# Patient Record
Sex: FEMALE | Race: BLACK OR AFRICAN AMERICAN | Employment: UNEMPLOYED | RURAL
[De-identification: names, ages, dates, MRNs, and addresses within clinical notes are randomized per-mention and may not be internally consistent; named-entity substitution may affect disease eponyms.]

---

## 2018-01-26 ENCOUNTER — OFFICE VISIT (OUTPATIENT)
Dept: FAMILY MEDICINE CLINIC | Age: 28
End: 2018-01-26

## 2018-01-26 VITALS
TEMPERATURE: 98.1 F | SYSTOLIC BLOOD PRESSURE: 112 MMHG | DIASTOLIC BLOOD PRESSURE: 72 MMHG | WEIGHT: 268 LBS | HEIGHT: 67 IN | HEART RATE: 92 BPM | OXYGEN SATURATION: 99 % | BODY MASS INDEX: 42.06 KG/M2

## 2018-01-26 DIAGNOSIS — Z11.3 SCREEN FOR STD (SEXUALLY TRANSMITTED DISEASE): ICD-10-CM

## 2018-01-26 DIAGNOSIS — F33.9 RECURRENT DEPRESSION (HCC): ICD-10-CM

## 2018-01-26 DIAGNOSIS — R06.2 WHEEZING: ICD-10-CM

## 2018-01-26 DIAGNOSIS — Z01.419 WELL WOMAN EXAM WITH ROUTINE GYNECOLOGICAL EXAM: Primary | ICD-10-CM

## 2018-01-26 DIAGNOSIS — L30.9 ECZEMA, UNSPECIFIED TYPE: ICD-10-CM

## 2018-01-26 RX ORDER — TRIAMCINOLONE ACETONIDE 0.25 MG/G
OINTMENT TOPICAL 2 TIMES DAILY
Qty: 454 G | Refills: 1 | Status: SHIPPED | OUTPATIENT
Start: 2018-01-26 | End: 2019-01-25

## 2018-01-26 RX ORDER — AMITRIPTYLINE HYDROCHLORIDE 10 MG/1
10 TABLET, FILM COATED ORAL
Qty: 30 TAB | Refills: 5 | Status: SHIPPED | OUTPATIENT
Start: 2018-01-26 | End: 2019-01-25

## 2018-01-26 RX ORDER — ALBUTEROL SULFATE 90 UG/1
1 AEROSOL, METERED RESPIRATORY (INHALATION)
Qty: 1 INHALER | Refills: 5 | Status: SHIPPED | OUTPATIENT
Start: 2018-01-26 | End: 2018-07-16 | Stop reason: SDUPTHER

## 2018-01-26 NOTE — MR AVS SNAPSHOT
[address] [facility] [phone] Patient: Jose Damian MRN: [id number] :1990 Visit Information Date & Time Provider Department Dept. Phone Encounter #  
 2018  2:00 PM Jean-Paul León NP [place] [address] [phone] [id number] Upcoming Health Maintenance Date Due  
 HPV AGE 9Y-34Y (1 of 3 - Female 3 Dose Series) 2002 PAP AKA CERVICAL CYTOLOGY 2012 Influenza Age 5 to Adult 2017 DTaP/Tdap/Td series (2 - Td) 2028 Allergies as of 2018  Review Complete On: 2018 By: Jean-Paul León NP Severity Noted Reaction Type Reactions Peanut  2016    Hives Seafood  2016    Hives Current Immunizations  Reviewed on 2018 No immunizations on file. Reviewed by Jackson Britt LPN on  at  2:05 PM  
You Were Diagnosed With   
  
 Codes Comments Well woman exam with routine gynecological exam    -  Primary ICD-10-CM: J19.462 ICD-9-CM: V72.31 Wheezing     ICD-10-CM: R06.2 ICD-9-CM: 786.07 Eczema, unspecified type     ICD-10-CM: L30.9 ICD-9-CM: 692.9 Recurrent depression ([address]     ICD-10-CM: F33.9 ICD-9-CM: 296.30 Screen for STD (sexually transmitted disease)     ICD-10-CM: Z11.3 ICD-9-CM: V74.5 Vitals BP Pulse Temp Height(growth percentile) Weight(growth percentile) SpO2  
 112/72 92 98.1 °F (36.7 °C) (Oral) 5' 7\" (1.702 m) 268 lb (121.6 kg) 99% BMI OB Status Smoking Status 41.97 kg/m2 Recent pregnancy Never Smoker Vitals History BMI and BSA Data Body Mass Index Body Surface Area 41.97 kg/m 2 2.4 m 2 Preferred Pharmacy Pharmacy Name Phone [id number] [place], [address] [phone] Your Updated Medication List  
  
   
This list is accurate as of: 18  2:49 PM.  Always use your most recent med list.  
  
  
  
  
 albuterol 90 mcg/actuation inhaler Commonly known as:  PROVENTIL HFA, VENTOLIN HFA, PROAIR HFA Take 1 Puff by inhalation every four (4) hours as needed for Wheezing. amitriptyline 10 mg tablet Commonly known as:  ELAVIL Take 1 Tab by mouth nightly. crisaborole 2 % Oint Commonly known as:  Brigid Abel Use thin layer to affected area daily  
  
 triamcinolone acetonide 0.025 % ointment Commonly known as:  KENALOG Apply  to affected area two (2) times a day. use thin layer Prescriptions Sent to Pharmacy Refills  
 albuterol (PROVENTIL HFA, VENTOLIN HFA, PROAIR HFA) 90 mcg/actuation inhaler 5 Sig: Take 1 Puff by inhalation every four (4) hours as needed for Wheezing. Class: Normal  
 Pharmacy: [facility]/pharmacy #[id number] [place], [address] Ph #: [phone] Route: Inhalation  
 triamcinolone acetonide (KENALOG) 0.025 % ointment 1 Sig: Apply  to affected area two (2) times a day. use thin layer Class: Normal  
 Pharmacy: [facility]/pharmacy #[id number] Marien Dellen, [address] Ph #: [phone] Route: Topical  
 amitriptyline (ELAVIL) 10 mg tablet 5 Sig: Take 1 Tab by mouth nightly. Class: Normal  
 Pharmacy: [facility]/pharmacy #[id number] Marien Dellen, [address] Ph #: [phone] Route: Oral  
  
We Performed the Following CBC WITH AUTOMATED DIFF [23455 CPT(R)] CT/NG/T.VAGINALIS AMPLIFICATION P5732428 CPT(R)] LIPID PANEL [35023 CPT(R)] METABOLIC PANEL, COMPREHENSIVE [69858 CPT(R)] PAP IGP,APTIMA HPV AGE GDLN [PVM7979 Custom] TSH 3RD GENERATION [46549 CPT(R)] Patient Instructions If you have any questions regarding [employer], you may call [employer] support at [phone]. Introducing [facility] & HEALTH SERVICES!    
 [employer] introduces [employer] patient portal. Now you can access parts of your medical record, email your doctor's office, and request medication refills online. 1. In your internet browser, go to [email or web address] 2. Click on the First Time User? Click Here link in the Sign In box. You will see the New Member Sign Up page. 3. Enter your Parko Access Code exactly as it appears below. You will not need to use this code after youve completed the sign-up process. If you do not sign up before the expiration date, you must request a new code. · Parko Access Code: [email or web address] Expires: 2/24/2018  4:19 PM 
 
4. Enter the last four digits of your Social Security Number (xxxx) and Date of Birth (mm/dd/yyyy) as indicated and click Submit. You will be taken to the next sign-up page. 5. Create a Parko ID. This will be your Parko login ID and cannot be changed, so think of one that is secure and easy to remember. 6. Create a Parko password. You can change your password at any time. 7. Enter your Password Reset Question and Answer. This can be used at a later time if you forget your password. 8. Enter your e-mail address. You will receive e-mail notification when new information is available in [address] 9. Click Sign Up. You can now view and download portions of your medical record. 10. Click the Download Summary menu link to download a portable copy of your medical information. If you have questions, please visit the Frequently Asked Questions section of the Parko website. Remember, Parko is NOT to be used for urgent needs. For medical emergencies, dial 911. Now available from your iPhone and Android! Please provide this summary of care documentation to your next provider. Your primary care clinician is listed as Una Saleh. If you have any questions after today's visit, please call [phone].

## 2018-01-26 NOTE — PROGRESS NOTES
Chief Complaint   Patient presents with    Establish Care     rash over left eye         HPI:       is a 33 y.o. female. Works for [employer]. In the [employer] office. Works two jobs. Has a young son. Asthma: Uses an albuterol inhaler intermittently. Eczema: Flares from time-to-time. Has used steroid creams in the past with great result. Has been out for a while. Using Vaseline for daily care. Depression: Previously on Prozac, but it made her really tired. She has not had the energy to do anything has been gaining weight. New Issues:  Has a rash over her left eye that is improving. Was a red bump. It drained. Has been trying to leave it alone. Allergies   Allergen Reactions    Peanut Hives    Seafood Hives       Current Outpatient Prescriptions   Medication Sig    albuterol (PROVENTIL HFA, VENTOLIN HFA, PROAIR HFA) 90 mcg/actuation inhaler Take  by inhalation. No current facility-administered medications for this visit. Past Medical History:   Diagnosis Date    Asthma        Past Surgical History:   Procedure Laterality Date    HX OTHER SURGICAL      eczema       Social History     Social History    Marital status: SINGLE     Spouse name: N/A    Number of children: N/A    Years of education: N/A     Social History Main Topics    Smoking status: Never Smoker    Smokeless tobacco: Never Used    Alcohol use No    Drug use: None    Sexual activity: Yes     Partners: Male     Other Topics Concern    None     Social History Narrative       No family history on file. Above history reviewed. ROS:  Denies fever, chills, cough, chest pain, SOB,  nausea, vomiting, or diarrhea. Denies wt loss, wt gain, hemoptysis, hematochezia or melena.     Physical Examination:    /72 Comment: large cuff  Pulse 92  Temp 98.1 °F (36.7 °C) (Oral)   Ht 5' 7\" (1.702 m)  Wt 268 lb (121.6 kg)  SpO2 99%  BMI 41.97 kg/m2    General: Alert and Ox3, Fluent speech, overweight  HEENT:  PERRLA, EOM intact, TMs, turbinates, pharynx normal.  No thyromegaly. No cervical adenopathy. Neck:  Supple, no adenopathy, JVD, mass or bruit  Chest:  Clear to Ausculation, without wheezes, rales, rubs or ronchi  Cardiac: RRR  Abdomen:  +BS, soft, nontender without palpable HSM  Genitourinary: The external genitalia is without lesions. Introitus is normal, vaginal walls pink and moist without lesions or evidence of trauma. There is no cervical motion tenderness and the adnexa are without masses. There is no abnormal discharge from the cervix. Extremities:  No cyanosis, clubbing or edema  Neurologic:  Ambulatory without assist, CN 2-12 grossly intact. Moves all extremities. Skin: Dry flaky areas over face, elbows and lower legs. Lymphadenopathy: no cervical or supraclavicular nodes    ASSESSMENT AND PLAN:     1. Well woman exam with routine gynecological exam  I have reviewed/discussed the above normal BMI with the patient. I have recommended the following interventions: dietary management education, guidance, and counseling, encourage exercise and monitor weight . Arvid Robb - PAP IGP,APTIMA HPV AGE GDLN  - CT/NG/T.VAGINALIS AMPLIFICATION  - LIPID PANEL  - METABOLIC PANEL, COMPREHENSIVE  - TSH 3RD GENERATION  - CBC WITH AUTOMATED DIFF    2. Wheezing  Refilling  - albuterol (PROVENTIL HFA, VENTOLIN HFA, PROAIR HFA) 90 mcg/actuation inhaler; Take 1 Puff by inhalation every four (4) hours as needed for Wheezing. Dispense: 1 Inhaler; Refill: 5    3. Eczema, unspecified type  Refilling Kenalog  Trying Eucrisa, will send in larger amount if effective  - triamcinolone acetonide (KENALOG) 0.025 % ointment; Apply  to affected area two (2) times a day. use thin layer  Dispense: 454 g; Refill: 1  - crisaborole (EUCRISA) 2 % oint; Use thin layer to affected area daily  Dispense: 2.5 g; Refill: 0    4.  Recurrent depression ([address]  Starting Amitriptyline, may need to increase dose  - amitriptyline (ELAVIL) 10 mg tablet; Take 1 Tab by mouth nightly. Dispense: 30 Tab; Refill: 5    5.  Screen for STD (sexually transmitted disease)  Screening  - CT/NG/T.VAGINALIS AMPLIFICATION     RTC in 1 month    Berna Colbert NP

## 2018-01-26 NOTE — PATIENT INSTRUCTIONS
If you have any questions regarding [facility], you may call [employer] support at [phone]. Depression and Chronic Disease: Care Instructions  Your Care Instructions    A chronic disease is one that you have for a long time. Some chronic diseases can be controlled, but they usually cannot be cured. Depression is common in people with chronic diseases, but it often goes unnoticed. Many people have concerns about seeking treatment for a mental health problem. You may think it's a sign of weakness, or you don't want people to know about it. It's important to overcome these reasons for not seeking treatment. Treating depression or anxiety is good for your health. Follow-up care is a key part of your treatment and safety. Be sure to make and go to all appointments, and call your doctor if you are having problems. It's also a good idea to know your test results and keep a list of the medicines you take. How can you care for yourself at home? Watch for symptoms of depression  The symptoms of depression are often subtle at first. You may think they are caused by your disease rather than depression. Or you may think it is normal to be depressed when you have a chronic disease. If you are depressed you may:  · Feel sad or hopeless. · Feel guilty or worthless. · Not enjoy the things you used to enjoy. · Feel hopeless, as though life is not worth living. · Have trouble thinking or remembering. · Have low energy, and you may not eat or sleep well. · Pull away from others. · Think often about death or killing yourself. (Keep the numbers for these national suicide hotlines: 9-811-822-TALK [1-328.393.8727] and 2-064-OTFUXDM [1-197.600.6373]. )  Get treatment  By treating your depression, you can feel more hopeful and have more energy. If you feel better, you may take better care of yourself, so your health may improve. · Talk to your doctor if you have any changes in mood during treatment for your disease.   · Ask your doctor for help. Counseling, antidepressant medicine, or a combination of the two can help most people with depression. Often a combination works best. Counseling can also help you cope with having a chronic disease. When should you call for help? Call 911 anytime you think you may need emergency care. For example, call if:  ? · You feel like hurting yourself or someone else. ? · Someone you know has depression and is about to attempt or is attempting suicide. ?Call your doctor now or seek immediate medical care if:  ? · You hear voices. ? · Someone you know has depression and:  ¨ Starts to give away his or her possessions. ¨ Uses illegal drugs or drinks alcohol heavily. ¨ Talks or writes about death, including writing suicide notes or talking about guns, knives, or pills. ¨ Starts to spend a lot of time alone. ¨ Acts very aggressively or suddenly appears calm. ? Watch closely for changes in your health, and be sure to contact your doctor if:  ? · You do not get better as expected. Where can you learn more? Go to http://richard-emanuel.info/. Enter D360 in the search box to learn more about \"Depression and Chronic Disease: Care Instructions. \"  Current as of: May 12, 2017  Content Version: 11.4  © 2482-4105 Healthwise, Incorporated. Care instructions adapted under license by [employer] (which disclaims liability or warranty for this information). If you have questions about a medical condition or this instruction, always ask your healthcare professional. [place] [address] any warranty or liability for your use of this information. Well Visit, Ages 25 to 48: Care Instructions  Your Care Instructions    Physical exams can help you stay healthy. Your doctor has checked your overall health and may have suggested ways to take good care of yourself. He or she also may have recommended tests.  At home, you can help prevent illness with healthy eating, regular exercise, and other steps. Follow-up care is a key part of your treatment and safety. Be sure to make and go to all appointments, and call your doctor if you are having problems. It's also a good idea to know your test results and keep a list of the medicines you take. How can you care for yourself at home? · Reach and stay at a healthy weight. This will lower your risk for many problems, such as obesity, diabetes, heart disease, and high blood pressure. · Get at least 30 minutes of physical activity on most days of the week. Walking is a good choice. You also may want to do other activities, such as running, swimming, cycling, or playing tennis or team sports. Discuss any changes in your exercise program with your doctor. · Do not smoke or allow others to smoke around you. If you need help quitting, talk to your doctor about stop-smoking programs and medicines. These can increase your chances of quitting for good. · Talk to your doctor about whether you have any risk factors for sexually transmitted infections (STIs). Having one sex partner (who does not have STIs and does not have sex with anyone else) is a good way to avoid these infections. · Use birth control if you do not want to have children at this time. Talk with your doctor about the choices available and what might be best for you. · Protect your skin from too much sun. When you're outdoors from 10 a.m. to 4 p.m., stay in the shade or cover up with clothing and a hat with a wide brim. Wear sunglasses that block UV rays. Even when it's cloudy, put broad-spectrum sunscreen (SPF 30 or higher) on any exposed skin. · See a dentist one or two times a year for checkups and to have your teeth cleaned. · Wear a seat belt in the car. · Drink alcohol in moderation, if at all. That means no more than 2 drinks a day for men and 1 drink a day for women. Follow your doctor's advice about when to have certain tests.  These tests can spot problems early.  For everyone  · Cholesterol. Have the fat (cholesterol) in your blood tested after age 21. Your doctor will tell you how often to have this done based on your age, family history, or other things that can increase your risk for heart disease. · Blood pressure. Have your blood pressure checked during a routine doctor visit. Your doctor will tell you how often to check your blood pressure based on your age, your blood pressure results, and other factors. · Vision. Talk with your doctor about how often to have a glaucoma test.  · Diabetes. Ask your doctor whether you should have tests for diabetes. · Colon cancer. Have a test for colon cancer at age 48. You may have one of several tests. If you are younger than 48, you may need a test earlier if you have any risk factors. Risk factors include whether you already had a precancerous polyp removed from your colon or whether your parent, brother, sister, or child has had colon cancer. For women  · Breast exam and mammogram. Talk to your doctor about when you should have a clinical breast exam and a mammogram. Medical experts differ on whether and how often women under 50 should have these tests. Your doctor can help you decide what is right for you. · Pap test and pelvic exam. Begin Pap tests at age 24. A Pap test is the best way to find cervical cancer. The test often is part of a pelvic exam. Ask how often to have this test.  · Tests for sexually transmitted infections (STIs). Ask whether you should have tests for STIs. You may be at risk if you have sex with more than one person, especially if your partners do not wear condoms. For men  · Tests for sexually transmitted infections (STIs). Ask whether you should have tests for STIs. You may be at risk if you have sex with more than one person, especially if you do not wear a condom. · Testicular cancer exam. Ask your doctor whether you should check your testicles regularly.   · Prostate exam. Talk to your doctor about whether you should have a blood test (called a PSA test) for prostate cancer. Experts differ on whether and when men should have this test. Some experts suggest it if you are older than 39 and are -American or have a father or brother who got prostate cancer when he was younger than 72. When should you call for help? Watch closely for changes in your health, and be sure to contact your doctor if you have any problems or symptoms that concern you. Where can you learn more? Go to http://richard-emanuel.info/. Enter P072 in the search box to learn more about \"Well Visit, Ages 25 to 48: Care Instructions. \"  Current as of: May 12, 2017  Content Version: 11.4  © 7987-7745 Healthwise, Incorporated. Care instructions adapted under license by [employer] (which disclaims liability or warranty for this information). If you have questions about a medical condition or this instruction, always ask your healthcare professional. [place] [address] any warranty or liability for your use of this information.

## 2018-01-30 LAB
AGE GDLN ACOG TESTING, 191185: NORMAL
CYTOLOGIST CVX/VAG CYTO: NORMAL
CYTOLOGY CVX/VAG DOC THIN PREP: NORMAL
DX ICD CODE: NORMAL
LABCORP, 190119: NORMAL
Lab: NORMAL
OTHER STN SPEC: NORMAL
PATH REPORT.FINAL DX SPEC: NORMAL
STAT OF ADQ CVX/VAG CYTO-IMP: NORMAL

## 2018-01-31 LAB
ALBUMIN SERPL-MCNC: 3.6 G/DL (ref 3.5–5.5)
ALBUMIN/GLOB SERPL: 1 {RATIO} (ref 1.2–2.2)
ALP SERPL-CCNC: 95 IU/L (ref 39–117)
ALT SERPL-CCNC: 13 IU/L (ref 0–32)
AST SERPL-CCNC: 17 IU/L (ref 0–40)
BASOPHILS # BLD AUTO: 0 X10E3/UL (ref 0–0.2)
BASOPHILS NFR BLD AUTO: 0 %
BILIRUB SERPL-MCNC: 0.3 MG/DL (ref 0–1.2)
BUN SERPL-MCNC: 12 MG/DL (ref 6–20)
BUN/CREAT SERPL: 14 (ref 9–23)
C TRACH RRNA SPEC QL NAA+PROBE: NEGATIVE
CALCIUM SERPL-MCNC: 8.9 MG/DL (ref 8.7–10.2)
CHLORIDE SERPL-SCNC: 101 MMOL/L (ref 96–106)
CHOLEST SERPL-MCNC: 162 MG/DL (ref 100–199)
CO2 SERPL-SCNC: 22 MMOL/L (ref 18–29)
CREAT SERPL-MCNC: 0.87 MG/DL (ref 0.57–1)
EOSINOPHIL # BLD AUTO: 0.5 X10E3/UL (ref 0–0.4)
EOSINOPHIL NFR BLD AUTO: 4 %
ERYTHROCYTE [DISTWIDTH] IN BLOOD BY AUTOMATED COUNT: 14 % (ref 12.3–15.4)
GFR SERPLBLD CREATININE-BSD FMLA CKD-EPI: 106 ML/MIN/1.73
GFR SERPLBLD CREATININE-BSD FMLA CKD-EPI: 92 ML/MIN/1.73
GLOBULIN SER CALC-MCNC: 3.6 G/DL (ref 1.5–4.5)
GLUCOSE SERPL-MCNC: 87 MG/DL (ref 65–99)
HCT VFR BLD AUTO: 38.2 % (ref 34–46.6)
HDLC SERPL-MCNC: 32 MG/DL
HGB BLD-MCNC: 13 G/DL (ref 11.1–15.9)
IMM GRANULOCYTES # BLD: 0 X10E3/UL (ref 0–0.1)
IMM GRANULOCYTES NFR BLD: 0 %
LDLC SERPL CALC-MCNC: 111 MG/DL (ref 0–99)
LYMPHOCYTES # BLD AUTO: 4.3 X10E3/UL (ref 0.7–3.1)
LYMPHOCYTES NFR BLD AUTO: 31 %
MCH RBC QN AUTO: 30.9 PG (ref 26.6–33)
MCHC RBC AUTO-ENTMCNC: 34 G/DL (ref 31.5–35.7)
MCV RBC AUTO: 91 FL (ref 79–97)
MONOCYTES # BLD AUTO: 1.8 X10E3/UL (ref 0.1–0.9)
MONOCYTES NFR BLD AUTO: 13 %
N GONORRHOEA RRNA SPEC QL NAA+PROBE: NEGATIVE
NEUTROPHILS # BLD AUTO: 7.4 X10E3/UL (ref 1.4–7)
NEUTROPHILS NFR BLD AUTO: 52 %
PLATELET # BLD AUTO: 391 X10E3/UL (ref 150–379)
POTASSIUM SERPL-SCNC: 4.3 MMOL/L (ref 3.5–5.2)
PROT SERPL-MCNC: 7.2 G/DL (ref 6–8.5)
RBC # BLD AUTO: 4.21 X10E6/UL (ref 3.77–5.28)
SODIUM SERPL-SCNC: 139 MMOL/L (ref 134–144)
T VAGINALIS RRNA SPEC QL NAA+PROBE: NEGATIVE
TRIGL SERPL-MCNC: 95 MG/DL (ref 0–149)
TSH SERPL DL<=0.005 MIU/L-ACNC: 3.54 UIU/ML (ref 0.45–4.5)
VLDLC SERPL CALC-MCNC: 19 MG/DL (ref 5–40)
WBC # BLD AUTO: 14.2 X10E3/UL (ref 3.4–10.8)

## 2018-02-01 ENCOUNTER — TELEPHONE (OUTPATIENT)
Dept: FAMILY MEDICINE CLINIC | Age: 28
End: 2018-02-01

## 2018-02-01 NOTE — TELEPHONE ENCOUNTER
----- Message from Kenton Flores NP sent at 2/1/2018  8:05 AM EST -----  Normal pap. Negative for STIs. Cholesterol is decent. Liver, kidneys and electrolytes are good. Thyroid level is OK. Blood count is indicative of recent infection.

## 2018-02-01 NOTE — PROGRESS NOTES
Normal pap. Negative for STIs. Cholesterol is decent. Liver, kidneys and electrolytes are good. Thyroid level is OK. Blood count is indicative of recent infection.

## 2018-07-16 ENCOUNTER — TELEPHONE (OUTPATIENT)
Dept: FAMILY MEDICINE CLINIC | Age: 28
End: 2018-07-16

## 2018-07-16 ENCOUNTER — OFFICE VISIT (OUTPATIENT)
Dept: FAMILY MEDICINE CLINIC | Age: 28
End: 2018-07-16

## 2018-07-16 VITALS
DIASTOLIC BLOOD PRESSURE: 80 MMHG | TEMPERATURE: 98 F | HEART RATE: 102 BPM | HEIGHT: 67 IN | SYSTOLIC BLOOD PRESSURE: 117 MMHG | WEIGHT: 272 LBS | RESPIRATION RATE: 19 BRPM | BODY MASS INDEX: 42.69 KG/M2 | OXYGEN SATURATION: 99 %

## 2018-07-16 DIAGNOSIS — E66.3 OVERWEIGHT: ICD-10-CM

## 2018-07-16 DIAGNOSIS — R06.2 WHEEZING: ICD-10-CM

## 2018-07-16 DIAGNOSIS — J45.21 MILD INTERMITTENT ASTHMA WITH EXACERBATION: Primary | ICD-10-CM

## 2018-07-16 RX ORDER — PREDNISONE 10 MG/1
30 TABLET ORAL
Qty: 15 TAB | Refills: 0 | Status: SHIPPED | OUTPATIENT
Start: 2018-07-16 | End: 2018-07-21

## 2018-07-16 RX ORDER — ALBUTEROL SULFATE 90 UG/1
1 AEROSOL, METERED RESPIRATORY (INHALATION)
Qty: 1 INHALER | Refills: 5 | Status: SHIPPED | OUTPATIENT
Start: 2018-07-16 | End: 2019-01-25

## 2018-07-16 NOTE — PATIENT INSTRUCTIONS
If you have any questions regarding [employer], you may call [employer] support at [phone].

## 2018-07-16 NOTE — MR AVS SNAPSHOT
[address] [facility] 
 
 
 [address] [facility],5Th Floor [facility] [phone] Patient: Rodolfo Aguilera MRN: [id number] :1990 Visit Information Date & Time Provider Department Dept. Phone Encounter #  
 2018  1:40 [place] [address] [address] [place] [id number] Follow-up Instructions Return if symptoms worsen or fail to improve. Follow-up and Disposition History Upcoming Health Maintenance Date Due  
 HPV Age 9Y-34Y (1 of 1 - Female 3 Dose Series) 2002 Influenza Age 5 to Adult 2018 PAP AKA CERVICAL CYTOLOGY 2021 DTaP/Tdap/Td series (2 - Td) 2028 Allergies as of 2018  Review Complete On: 2018 By: Naina Dunn NP Severity Noted Reaction Type Reactions Peanut  2016    Hives Seafood  2016    Hives Current Immunizations  Reviewed on 2018 No immunizations on file. Not reviewed this visit You Were Diagnosed With   
  
 Codes Comments Mild intermittent asthma with exacerbation    -  Primary ICD-10-CM: J45.21 ICD-9-CM: 651.71 Wheezing     ICD-10-CM: R06.2 ICD-9-CM: 786.07 Overweight     ICD-10-CM: I92.8 ICD-9-CM: 278.02 Vitals BP Pulse Temp Resp Height(growth percentile) Weight(growth percentile) 117/80 (BP 1 Location: Right arm, BP Patient Position: Sitting) (!) 102 98 °F (36.7 °C) (Oral) 19 5' 7\" (1.702 m) 272 lb (123.4 kg) SpO2 BMI OB Status Smoking Status 99% 42.6 kg/m2 Recent pregnancy Never Smoker Vitals History BMI and BSA Data Body Mass Index Body Surface Area  
 42.6 kg/m 2 2.42 m 2 Preferred [facility]acy [id number] [phone] Your Updated Medication List  
  
   
This list is accurate as of 18  2:35 PM.  Always use your most recent med list.  
  
  
  
  
 albuterol 90 mcg/actuation inhaler Commonly known as:  PROVENTIL HFA, VENTOLIN HFA, PROAIR HFA Take 1 Puff by inhalation every four (4) hours as needed for Wheezing. amitriptyline 10 mg tablet Commonly known as:  ELAVIL Take 1 Tab by mouth nightly. predniSONE 10 mg tablet Commonly known as:  [employer] Take 3 Tabs by mouth daily (with breakfast) for 5 days. triamcinolone acetonide 0.025 % ointment Commonly known as:  KENALOG Apply  to affected area two (2) times a day. use thin layer Prescriptions Sent to Pharmacy Refills  
 predniSONE (DELTASONE) 10 mg tablet 0 Sig: Take 3 Tabs by mouth daily (with breakfast) for 5 days. Class: Normal  
 Pharmacy: [facility]/pharmacy #[id number] [place] [address] [address] Ph #: [phone] Route: Oral  
 albuterol (PROVENTIL HFA, VENTOLIN HFA, PROAIR HFA) 90 mcg/actuation inhaler 5 Sig: Take 1 Puff by inhalation every four (4) hours as needed for Wheezing. Class: Normal  
 Pharmacy: [facility]/pharmacy #[id number] [place] [address] [address] Ph #: [phone] Route: Inhalation Follow-up Instructions Return if symptoms worsen or fail to improve. Patient Instructions If you have any questions regarding [employer], you may call [employer] support at [phone]. [facility]! [facility] introduces [employer] patient portal. Now you can access parts of your medical record, email your doctor's office, and request medication refills online. 1. In your internet browser, go to [email or web address] 2. Click on the First Time User? Click Here link in the Sign In box. You will see the New Member Sign Up page. 3. Enter your [employer]ad Access Code exactly as it appears below. You will not need to use this code after youve completed the sign-up process. If you do not sign up before the expiration date, you must request a new code. · AeroFS Access Code: [email or web address] Expires: 10/14/2018  1:33 PM 
 
4. Enter the last four digits of your Social Security Number (xxxx) and Date of Birth (mm/dd/yyyy) as indicated and click Submit. You will be taken to the next sign-up page. 5. Create a AeroFS ID. This will be your AeroFS login ID and cannot be changed, so think of one that is secure and easy to remember. 6. Create a AeroFS password. You can change your password at any time. 7. Enter your Password Reset Question and Answer. This can be used at a later time if you forget your password. 8. Enter your e-mail address. You will receive e-mail notification when new information is available in [address] 9. Click Sign Up. You can now view and download portions of your medical record. 10. Click the Download Summary menu link to download a portable copy of your medical information. If you have questions, please visit the Frequently Asked Questions section of the Aero[employer] website. Remember, [employer] is NOT to be used for urgent needs. For medical emergencies, dial 911. Now available from your iPhone and Android! Please provide this summary of care documentation to your next provider. Your primary care clinician is listed as Naina Dunn. If you have any questions after today's visit, please call [phone].

## 2018-07-16 NOTE — PROGRESS NOTES
Chief Complaint   Patient presents with    Nasal Congestion     shortness of breathh, tightness in chest.         HPI:       is a 33 y.o. female. Works for [employer]. In the [employer] office. Has a young son.       Asthma: Uses an albuterol inhaler intermittently.       Eczema: Flares from time-to-time. Has used steroid creams in the past with great result. Has been out for a while. Using Vaseline for daily care.      Depression: Previously on Prozac, but it made her really tired. Switched to Amitriptyline last visit. Has been doing well. New Issues:  She has been sick for a few weeks. Originally seemed to be her asthma. Has had some intermittent chest tightness, SOB and wheezing. Has been using her inhaler about 3 times per day and used her sons nebulizer a few times which helped. Allergies   Allergen Reactions    Peanut Hives    Seafood Hives       Current Outpatient Prescriptions   Medication Sig    albuterol (PROVENTIL HFA, VENTOLIN HFA, PROAIR HFA) 90 mcg/actuation inhaler Take 1 Puff by inhalation every four (4) hours as needed for Wheezing.  triamcinolone acetonide (KENALOG) 0.025 % ointment Apply  to affected area two (2) times a day. use thin layer    amitriptyline (ELAVIL) 10 mg tablet Take 1 Tab by mouth nightly. No current facility-administered medications for this visit. Past Medical History:   Diagnosis Date    Asthma        Past Surgical History:   Procedure Laterality Date    HX OTHER SURGICAL      eczema       Social History     Social History    Marital status: SINGLE     Spouse name: N/A    Number of children: N/A    Years of education: N/A     Social History Main Topics    Smoking status: Never Smoker    Smokeless tobacco: Never Used    Alcohol use No    Drug use: None    Sexual activity: Yes     Partners: Male     Other Topics Concern    None     Social History Narrative       No family history on file.     Above history reviewed. ROS:  Denies fever, chills, POS cough, denies chest pain, POS SOB,  Denies nausea, vomiting, or diarrhea. Denies wt loss, wt gain, hemoptysis, hematochezia or melena. Physical Examination:    /80 (BP 1 Location: Right arm, BP Patient Position: Sitting)  Pulse (!) 102  Temp 98 °F (36.7 °C) (Oral)   Resp 19  Ht 5' 7\" (1.702 m)  Wt 272 lb (123.4 kg)  SpO2 99%  BMI 42.6 kg/m2    General: Alert and Ox3, Fluent speech  HEENT:  PERRLA, EOM intact, TMs, turbinates, pharynx normal.  No thyromegaly. No cervical adenopathy. Neck:  Supple, no adenopathy, JVD, mass or bruit  Chest:  Clear to Ausculation upper lobes, diminished in bases  Cardiac: RRR  Extremities:  No cyanosis, clubbing or edema  Neurologic:  Ambulatory without assist, CN 2-12 grossly intact. Moves all extremities. Skin: no rash  Lymphadenopathy: no cervical or supraclavicular nodes    ASSESSMENT AND PLAN:     1. Mild intermittent asthma with exacerbation  Symptoms are viral. Discussed recommendation to avoid antibiotic. Reviewed self care measures:  Fluids  Nasal Saline  Humidification + menthol petroleum (Vicks.)  Postural drainage  NSAID of choice PRN  Avoid decongestants, too drying and difficult to clear respiratory secretions. - predniSONE (DELTASONE) 10 mg tablet; Take 3 Tabs by mouth daily (with breakfast) for 5 days. Dispense: 15 Tab; Refill: 0  - albuterol (PROVENTIL HFA, VENTOLIN HFA, PROAIR HFA) 90 mcg/actuation inhaler; Take 1 Puff by inhalation every four (4) hours as needed for Wheezing. Dispense: 1 Inhaler; Refill: 5    2. Wheezing  If continuing to need albuterol inhaler multiple times per day, may need to add in ICS. - predniSONE (DELTASONE) 10 mg tablet; Take 3 Tabs by mouth daily (with breakfast) for 5 days. Dispense: 15 Tab; Refill: 0  - albuterol (PROVENTIL HFA, VENTOLIN HFA, PROAIR HFA) 90 mcg/actuation inhaler; Take 1 Puff by inhalation every four (4) hours as needed for Wheezing.   Dispense: 1 Inhaler; Refill: 5    3. Overweight  I have reviewed/discussed the above normal BMI with the patient. I have recommended the following interventions: dietary management education, guidance, and counseling, encourage exercise and monitor weight . Ira Kolb         RTC PRN    Beba Gonsalez, NP

## 2018-08-01 ENCOUNTER — TELEPHONE (OUTPATIENT)
Dept: FAMILY MEDICINE CLINIC | Age: 28
End: 2018-08-01

## 2019-06-28 PROBLEM — E66.01 OBESITY, MORBID (HCC): Status: ACTIVE | Noted: 2019-06-28

## 2021-01-28 ENCOUNTER — OFFICE VISIT (OUTPATIENT)
Dept: PRIMARY CARE CLINIC | Age: 31
End: 2021-01-28

## 2021-01-28 VITALS — HEART RATE: 93 BPM | OXYGEN SATURATION: 97 %

## 2021-01-28 DIAGNOSIS — J03.00 STREP TONSILLITIS: ICD-10-CM

## 2021-01-28 DIAGNOSIS — U07.1 COVID-19: Primary | ICD-10-CM

## 2021-01-28 LAB
S PYO AG THROAT QL: POSITIVE
VALID INTERNAL CONTROL?: YES

## 2021-01-28 RX ORDER — AZITHROMYCIN 250 MG/1
TABLET, FILM COATED ORAL
Qty: 6 TAB | Refills: 0 | Status: SHIPPED | OUTPATIENT
Start: 2021-01-28 | End: 2021-06-29

## 2021-01-28 NOTE — LETTER
NOTIFICATION RETURN TO WORK / SCHOOL 
 
1/28/2021 12:04 PM 
 
Ms. Osbaldo Molina [place] 1990 
[address]  [facility] [address] [place] [address] To Whom It May Concern: 
 
Terri Tillman is currently under the care of [facility] Lizandro Az[facility], and  
 
was tested for covid 19 01/28/2021. It is recommended that the patient self-quarantine until the Covid  
 
test results return AND until they are symptom free without medication for 24 hours. She will return to work/school on: 02/02/2021 If there are questions or concerns please have the patient contact our office. Sincerely, Selam Wong MD/Trice Julian

## 2021-01-28 NOTE — PROGRESS NOTES
Ms. Des Schrader is a 35 y.o. female who is here for evaluation of   Chief Complaint   Patient presents with    Covid Testing    Sore Throat   . ASSESSMENT AND PLAN:  No work until testing is complete    1. COVID-19  Suspect COVID (both parents are +)  - azithromycin (ZITHROMAX) 250 mg tablet; Take 2 tablets today, then take 1 tablet daily  Dispense: 6 Tab; Refill: 0    2. Strep tonsillitis  - azithromycin (ZITHROMAX) 250 mg tablet; Take 2 tablets today, then take 1 tablet daily  Dispense: 6 Tab; Refill: 0      Orders Placed This Encounter    azithromycin (ZITHROMAX) 250 mg tablet     Sig: Take 2 tablets today, then take 1 tablet daily     Dispense:  6 Tab     Refill:  0           HPI  Sore throat and headache. Both parents are C+. ROS:    nausea, vomiting, or diarrhea. Denies wt loss, wt gain, hemoptysis, hematochezia or melena. Physical Examination:    Visit Vitals  Pulse 93   SpO2 97%      General:  Alert, cooperative, no distress. Head:  Normocephalic, without obvious abnormality, atraumatic. Eyes:  Conjunctivae/corneas clear. Pupils equal, round, reactive to light. Extraocular movements intact. Lungs:   Clear to auscultation bilaterally. Chest wall:  No tenderness or deformity. Cardiac:  RRR   Abdomen:   Soft, non-tender. Bowel sounds normal. No masses. No organomegaly. Extremities: Extremities normal, atraumatic, no cyanosis or edema. Pulses: 2+ and symmetric all extremities. Skin: Skin color, texture, turgor normal. No rashes or lesions. Lymph nodes: Cervical, supraclavicular, and axillary nodes normal.   Neurologic: CNII-XII intact. Normal strength, sensation, and reflexes throughout. On this date 01/28/21 I have spent 15 minutes reviewing previous notes, test results and face to face with the patient discussing the diagnosis and importance of compliance with the treatment plan as well as documenting on the day of the visit.     Mar Bowman MD FACP    (signed electronically) on 1/28/2021 at 12:08 PM

## 2021-01-30 LAB — SARS-COV-2, NAA: DETECTED

## 2021-06-29 ENCOUNTER — OFFICE VISIT (OUTPATIENT)
Dept: FAMILY MEDICINE CLINIC | Age: 31
End: 2021-06-29
Payer: COMMERCIAL

## 2021-06-29 VITALS
TEMPERATURE: 97.3 F | OXYGEN SATURATION: 97 % | SYSTOLIC BLOOD PRESSURE: 125 MMHG | HEIGHT: 67 IN | WEIGHT: 270.6 LBS | HEART RATE: 88 BPM | DIASTOLIC BLOOD PRESSURE: 80 MMHG | BODY MASS INDEX: 42.47 KG/M2 | RESPIRATION RATE: 22 BRPM

## 2021-06-29 DIAGNOSIS — Z11.59 NEED FOR HEPATITIS C SCREENING TEST: ICD-10-CM

## 2021-06-29 DIAGNOSIS — R73.01 IMPAIRED FASTING GLUCOSE: ICD-10-CM

## 2021-06-29 DIAGNOSIS — G43.829 MENSTRUAL MIGRAINE WITHOUT STATUS MIGRAINOSUS, NOT INTRACTABLE: Primary | ICD-10-CM

## 2021-06-29 DIAGNOSIS — E66.01 OBESITY, MORBID (HCC): ICD-10-CM

## 2021-06-29 DIAGNOSIS — F33.9 RECURRENT DEPRESSION (HCC): ICD-10-CM

## 2021-06-29 DIAGNOSIS — L30.9 ECZEMA, UNSPECIFIED TYPE: ICD-10-CM

## 2021-06-29 PROCEDURE — 99214 OFFICE O/P EST MOD 30 MIN: CPT | Performed by: NURSE PRACTITIONER

## 2021-06-29 RX ORDER — BUTALBITAL, ACETAMINOPHEN AND CAFFEINE 50; 325; 40 MG/1; MG/1; MG/1
1 TABLET ORAL
Qty: 10 TABLET | Refills: 2 | Status: SHIPPED | OUTPATIENT
Start: 2021-06-29 | End: 2022-05-13

## 2021-06-29 RX ORDER — NORGESTIMATE AND ETHINYL ESTRADIOL 7DAYSX3 28
KIT ORAL
COMMUNITY
Start: 2021-06-16 | End: 2022-05-13

## 2021-06-29 NOTE — PROGRESS NOTES
Chief Complaint   Patient presents with    Physical     3 most recent PHQ Screens 6/29/2021   Little interest or pleasure in doing things Not at all   Feeling down, depressed, irritable, or hopeless Not at all   Total Score PHQ 2 0   Trouble falling or staying asleep, or sleeping too much -   Feeling tired or having little energy -   Poor appetite, weight loss, or overeating -   Feeling bad about yourself - or that you are a failure or have let yourself or your family down -   Trouble concentrating on things such as school, work, reading, or watching TV -   Moving or speaking so slowly that other people could have noticed; or the opposite being so fidgety that others notice -   Thoughts of being better off dead, or hurting yourself in some way -   PHQ 9 Score -   How difficult have these problems made it for you to do your work, take care of your home and get along with others -     Learning Assessment 6/29/2021   PRIMARY LEARNER Patient   PRIMARY LANGUAGE ENGLISH   LEARNER PREFERENCE PRIMARY READING   ANSWERED BY patient   RELATIONSHIP SELF     Fall Risk Assessment, last 12 mths 6/29/2021   Able to walk? Yes   Fall in past 12 months? 0   Do you feel unsteady? 0   Are you worried about falling 0     Abuse Screening Questionnaire 6/29/2021   Do you ever feel afraid of your partner? N   Are you in a relationship with someone who physically or mentally threatens you? N   Is it safe for you to go home?  Y     ADL Assessment 6/29/2021   Feeding yourself No Help Needed   Getting from bed to chair No Help Needed   Getting dressed No Help Needed   Bathing or showering No Help Needed   Walk across the room (includes cane/walker) No Help Needed   Using the telphone No Help Needed   Taking your medications No Help Needed   Preparing meals No Help Needed   Managing money (expenses/bills) No Help Needed   Moderately strenuous housework (laundry) No Help Needed   Shopping for personal items (toiletries/medicines) No Help Needed Shopping for groceries No Help Needed   Driving No Help Needed   Climbing a flight of stairs No Help Needed   Getting to places beyond walking distances No Help Needed     1. Have you been to the ER, urgent care clinic since your last visit? Hospitalized since your last visit? No    2. Have you seen or consulted any other health care providers outside of the [address] [place] [address] [place] since your last visit? Include any pap smears or colon screening. No      Chief Complaint   Patient presents with    Physical         Visit Vitals  /80 (BP 1 Location: Left arm, BP Patient Position: Sitting, BP Cuff Size: Adult long)   Pulse 88   Temp 97.3 °F (36.3 °C) (Temporal)   Resp 22   Ht 5' 7\" (1.702 m)   Wt 270 lb 9.6 oz (122.7 kg)   SpO2 97%   BMI 42.38 kg/m²       Pain Scale: 0 - No pain/10  Pain Location:     Itz Cuellar is a 35 y.o. female presenting for/with:    Physical      Symptom review:    NO  Fever   NO  Shaking chills  NO  Cough  NO  Body aches  NO  Coughing up blood  NO  Chest congestion  NO  Chest pain  NO  Shortness of breath  NO  Profound Loss of smell/taste  NO  Nausea/Vomiting   NO  Loose stool/Diarrhea  NO  any skin issues    Patient Risk Factors Reviewed as follows:  NO  have you been in Close contact with confirmed COVID19 patient   NO  History of recent travel to affected geographical areas within the past 14 days  NO  COPD  NO  Active Cancer/Leukemia/Lymphoma/Chemotherapy  NO  Oral steroid use  NO  Pregnant  NO  Diabetes Mellitus  NO  Heart disease  NO  Asthma  NO Health care worker at home  NO Health care worker  NO Is there a Pregnant Woman in the home  NO Dialysis pt in the home   NO a large number of people living in the home    Recent Travel Screening and Travel History documentation     Travel Screening     Question   Response    In the last month, have you been in contact with someone who was confirmed or suspected to have Coronavirus / COVID-19?   No / Unsure    Have you had a COVID-19 viral test in the last 14 days? No    Do you have any of the following new or worsening symptoms? None of these    Have you traveled internationally or domestically in the last month?   No      Travel History   Travel since 05/29/21     No documented travel since 05/29/21

## 2021-06-29 NOTE — PROGRESS NOTES
Chief Complaint   Patient presents with    Physical         HPI:       is a 35 y.o. female. Works for [employer] the [employer] office. Kishan Parra a son.       Asthma: Uses an albuterol inhaler intermittently.       Eczema: Much improved since starting Dupixent. Managed by [facility]       Depression: Currently on Venlafaxine. Working much better. New Issues:  She is having migraines around her menstrual cycle. Pain is sometimes squeezing, other times pounding. Causes nausea and sensitivity to light. Takes Excedrin, but does not always work. Allergies   Allergen Reactions    Peanut Hives    Seafood Hives       Current Outpatient Medications   Medication Sig    Tri-Estarylla 0.18/0.215/0.25 mg-35 mcg (28) tab     venlafaxine-SR (EFFEXOR-XR) 37.5 mg capsule TAKE 1 CAPSULE BY MOUTH EVERY DAY    albuterol (PROVENTIL HFA, VENTOLIN HFA, PROAIR HFA) 90 mcg/actuation inhaler Take 2 Puffs by inhalation every four (4) hours as needed for Wheezing or Shortness of Breath. No current facility-administered medications for this visit. Past Medical History:   Diagnosis Date    Asthma        Past Surgical History:   Procedure Laterality Date    HX OTHER SURGICAL      eczema       Social History     Socioeconomic History    Marital status: SINGLE     Spouse name: Not on file    Number of children: Not on file    Years of education: Not on file    Highest education level: Not on file   Tobacco Use    Smoking status: Never Smoker    Smokeless tobacco: Never Used   Substance and Sexual Activity    Alcohol use: No    Sexual activity: Yes     Partners: Male     Social Determinants of Health     Financial Resource Strain:     Difficulty of Paying Living Expenses:    Food Insecurity:     Worried About Running Out of Food in the Last Year:     [address] in the Last Year:    Transportation Needs:     Lack of Transportation (Medical):      Lack of Transportation (Non-Medical):    Physical Activity:     Days of Exercise per Week:     Minutes of Exercise per Session:    Stress:     Feeling of Stress :    Social Connections:     Frequency of Communication with Friends and Family:     Frequency of Social Gatherings with Friends and Family:     Attends [employer] Services:     Active Member of Clubs or Organizations:     Attends Club or Organization Meetings:     Marital Status:        No family history on file. Above history reviewed. ROS:  Denies fever, chills, cough, chest pain, SOB,  nausea, vomiting, or diarrhea. Denies wt loss, wt gain, hemoptysis, hematochezia or melena. Physical Examination:    /80 (BP 1 Location: Left arm, BP Patient Position: Sitting, BP Cuff Size: Adult long)   Pulse 88   Temp 97.3 °F (36.3 °C) (Temporal)   Resp 22   Ht 5' 7\" (1.702 m)   Wt 270 lb 9.6 oz (122.7 kg)   SpO2 97%   BMI 42.38 kg/m²     General: Alert and Ox3, Fluent speech, overweight  HEENT:  PERRLA, EOM intact, TMs, turbinates, pharynx normal.  No thyromegaly. No cervical adenopathy. Neck:  Supple, no adenopathy, JVD, mass or bruit  Chest:  Clear to Ausculation, without wheezes, rales, rubs or ronchi  Cardiac: RRR  Abdomen:  +BS, soft, nontender without palpable HSM  Extremities:  No cyanosis, clubbing or edema  Neurologic:  Ambulatory without assist, CN 2-12 grossly intact. Moves all extremities. Skin: no rash  Lymphadenopathy: no cervical or supraclavicular nodes    ASSESSMENT AND PLAN:     1. Menstrual migraine without status migrainosus, not intractable  Start Fioricet PRN  Continue Excedrin PRN  Consider switching OCP to seasonal formulation  - butalbital-acetaminophen-caffeine (FIORICET, ESGIC) -40 mg per tablet; Take 1 Tablet by mouth every six (6) hours as needed for Headache. Dispense: 10 Tablet; Refill: 2    2. Eczema, unspecified type  Much improved on Dupixent     3. Recurrent depression ([address]  Doing well on Effexor     4.  Obesity, morbid ([address]  Getting back to exercising with   - CBC WITH AUTOMATED DIFF; Future  - LIPID PANEL; Future  - CBC WITH AUTOMATED DIFF  - LIPID PANEL    5. Need for hepatitis C screening test  - HEPATITIS C AB; Future  - HEPATITIS C AB    6. Impaired fasting glucose  - HEMOGLOBIN A1C WITH EAG;  Future  - HEMOGLOBIN A1C WITH EAG     RTC PRN     Karyna Maza NP

## 2021-06-30 LAB
BASOPHILS # BLD AUTO: 0.1 X10E3/UL (ref 0–0.2)
BASOPHILS NFR BLD AUTO: 1 %
CHOLEST SERPL-MCNC: 165 MG/DL (ref 100–199)
EOSINOPHIL # BLD AUTO: 0.6 X10E3/UL (ref 0–0.4)
EOSINOPHIL NFR BLD AUTO: 5 %
ERYTHROCYTE [DISTWIDTH] IN BLOOD BY AUTOMATED COUNT: 12.6 % (ref 11.7–15.4)
EST. AVERAGE GLUCOSE BLD GHB EST-MCNC: 120 MG/DL
HBA1C MFR BLD: 5.8 % (ref 4.8–5.6)
HCT VFR BLD AUTO: 37 % (ref 34–46.6)
HCV AB S/CO SERPL IA: <0.1 S/CO RATIO (ref 0–0.9)
HDLC SERPL-MCNC: 50 MG/DL
HGB BLD-MCNC: 12.5 G/DL (ref 11.1–15.9)
IMM GRANULOCYTES # BLD AUTO: 0 X10E3/UL (ref 0–0.1)
IMM GRANULOCYTES NFR BLD AUTO: 0 %
LDLC SERPL CALC-MCNC: 99 MG/DL (ref 0–99)
LYMPHOCYTES # BLD AUTO: 3.2 X10E3/UL (ref 0.7–3.1)
LYMPHOCYTES NFR BLD AUTO: 28 %
MCH RBC QN AUTO: 30.3 PG (ref 26.6–33)
MCHC RBC AUTO-ENTMCNC: 33.8 G/DL (ref 31.5–35.7)
MCV RBC AUTO: 90 FL (ref 79–97)
MONOCYTES # BLD AUTO: 1.1 X10E3/UL (ref 0.1–0.9)
MONOCYTES NFR BLD AUTO: 9 %
NEUTROPHILS # BLD AUTO: 6.6 X10E3/UL (ref 1.4–7)
NEUTROPHILS NFR BLD AUTO: 57 %
PLATELET # BLD AUTO: 400 X10E3/UL (ref 150–450)
RBC # BLD AUTO: 4.12 X10E6/UL (ref 3.77–5.28)
TRIGL SERPL-MCNC: 87 MG/DL (ref 0–149)
VLDLC SERPL CALC-MCNC: 16 MG/DL (ref 5–40)
WBC # BLD AUTO: 11.7 X10E3/UL (ref 3.4–10.8)

## 2022-01-19 LAB
ANTIBODY SCREEN, EXTERNAL: NORMAL
CHLAMYDIA, EXTERNAL: NORMAL
HBSAG, EXTERNAL: NORMAL
HIV, EXTERNAL: NON REACTIVE
N. GONORRHEA, EXTERNAL: NORMAL
RUBELLA, EXTERNAL: NORMAL
T. PALLIDUM, EXTERNAL: NON REACTIVE

## 2022-03-18 PROBLEM — L30.9 ECZEMA: Status: ACTIVE | Noted: 2018-01-26

## 2022-03-18 PROBLEM — E66.01 OBESITY, MORBID (HCC): Status: ACTIVE | Noted: 2019-06-28

## 2022-03-19 PROBLEM — F33.9 RECURRENT DEPRESSION (HCC): Status: ACTIVE | Noted: 2018-01-26

## 2022-06-22 ENCOUNTER — NURSE TRIAGE (OUTPATIENT)
Dept: OTHER | Facility: CLINIC | Age: 32
End: 2022-06-22

## 2022-06-22 NOTE — TELEPHONE ENCOUNTER
Received call from Julián at [facility] with Red Flag Complaint. Subjective: Caller states \"fell and has left ankle pain and swelling\"     Current Symptoms: fell down steps yesterday. It's swollen on outer ankle, no bruising yet. Is 29 weeks pregnant,    Only fell down a couple steps, baby moving normally, did not hit abdomen. She would like to be seen. She can bear weight but not much. Onset: 1 day ago; sudden, worsening    Pain Severity: 6/10 when tries to walk, when sitting pain is like a 4/10; throbbing; constant    Temperature: denies by unknown method    What has been tried: Ice, tylenol    LMP: pregnant Pregnant: Yes-  Is still feeling baby move well. 29 weeks    Recommended disposition: See in Office Today   She will call her OBGYN as well to discuss. Care advice provided, patient verbalizes understanding; denies any other questions or concerns; instructed to call back for any new or worsening symptoms. Patient/Caller agrees with recommended disposition; writer provided warm transfer to Kamilla Betancourt at [facility] for appointment scheduling    Attention Provider: Thank you for allowing me to participate in the care of your patient. The patient was connected to triage in response to information provided to the [facility]. Please do not respond through this encounter as the response is not directed to a shared pool.       Reason for Disposition   Patient wants to be seen   Patient has an injury to a specific part of the body (e.g., arm, leg, head)    Protocols used: ANKLE AND FOOT INJURY-ADULT-[place], PREGNANCY - FALL-ADULT-[place]

## 2022-06-23 NOTE — TELEPHONE ENCOUNTER
Spoke to patient 6/22, advised her to go to ER to have evaluated and possible x-ray done. Pt verbalized understanding.

## 2022-08-05 RX ORDER — BUTALBITAL, ACETAMINOPHEN AND CAFFEINE 50; 325; 40 MG/1; MG/1; MG/1
TABLET ORAL
Qty: 10 TABLET | Refills: 2 | Status: SHIPPED | OUTPATIENT
Start: 2022-08-05 | End: 2022-08-19

## 2022-08-09 LAB — GRBS, EXTERNAL: NORMAL

## 2022-08-16 ENCOUNTER — HOSPITAL ENCOUNTER (INPATIENT)
Age: 32
LOS: 3 days | Discharge: HOME | End: 2022-08-19
Attending: OBSTETRICS & GYNECOLOGY | Admitting: STUDENT IN AN ORGANIZED HEALTH CARE EDUCATION/TRAINING PROGRAM
Payer: COMMERCIAL

## 2022-08-16 DIAGNOSIS — R52 POSTPARTUM PAIN: Primary | ICD-10-CM

## 2022-08-16 PROBLEM — Z34.90 PREGNANT: Status: ACTIVE | Noted: 2022-08-16

## 2022-08-16 LAB
ABO + RH BLD: NORMAL
ALBUMIN SERPL-MCNC: 2.1 G/DL (ref 3.5–5)
ALBUMIN/GLOB SERPL: 0.5 {RATIO} (ref 1.1–2.2)
ALP SERPL-CCNC: 152 U/L (ref 45–117)
ALT SERPL-CCNC: 16 U/L (ref 12–78)
AMPHET UR QL SCN: NEGATIVE
ANION GAP SERPL CALC-SCNC: 9 MMOL/L (ref 5–15)
AST SERPL-CCNC: 24 U/L (ref 15–37)
BARBITURATES UR QL SCN: POSITIVE
BASOPHILS # BLD: 0 K/UL (ref 0–0.1)
BASOPHILS NFR BLD: 0 % (ref 0–1)
BENZODIAZ UR QL: NEGATIVE
BILIRUB SERPL-MCNC: 0.2 MG/DL (ref 0.2–1)
BLOOD GROUP ANTIBODIES SERPL: NORMAL
BUN SERPL-MCNC: 9 MG/DL (ref 6–20)
BUN/CREAT SERPL: 11 (ref 12–20)
CALCIUM SERPL-MCNC: 8.7 MG/DL (ref 8.5–10.1)
CANNABINOIDS UR QL SCN: NEGATIVE
CHLORIDE SERPL-SCNC: 109 MMOL/L (ref 97–108)
CO2 SERPL-SCNC: 21 MMOL/L (ref 21–32)
COCAINE UR QL SCN: NEGATIVE
CREAT SERPL-MCNC: 0.84 MG/DL (ref 0.55–1.02)
CREAT UR-MCNC: 266 MG/DL
DIFFERENTIAL METHOD BLD: ABNORMAL
DRUG SCRN COMMENT,DRGCM: ABNORMAL
EOSINOPHIL # BLD: 0.2 K/UL (ref 0–0.4)
EOSINOPHIL NFR BLD: 1 % (ref 0–7)
ERYTHROCYTE [DISTWIDTH] IN BLOOD BY AUTOMATED COUNT: 14.7 % (ref 11.5–14.5)
GLOBULIN SER CALC-MCNC: 4 G/DL (ref 2–4)
GLUCOSE SERPL-MCNC: 109 MG/DL (ref 65–100)
HCT VFR BLD AUTO: 33.4 % (ref 35–47)
HGB BLD-MCNC: 11.1 G/DL (ref 11.5–16)
IMM GRANULOCYTES # BLD AUTO: 0.1 K/UL (ref 0–0.04)
IMM GRANULOCYTES NFR BLD AUTO: 0 % (ref 0–0.5)
LYMPHOCYTES # BLD: 2.9 K/UL (ref 0.8–3.5)
LYMPHOCYTES NFR BLD: 22 % (ref 12–49)
MCH RBC QN AUTO: 30.5 PG (ref 26–34)
MCHC RBC AUTO-ENTMCNC: 33.2 G/DL (ref 30–36.5)
MCV RBC AUTO: 91.8 FL (ref 80–99)
METHADONE UR QL: NEGATIVE
MONOCYTES # BLD: 1.4 K/UL (ref 0–1)
MONOCYTES NFR BLD: 10 % (ref 5–13)
NEUTS SEG # BLD: 8.9 K/UL (ref 1.8–8)
NEUTS SEG NFR BLD: 67 % (ref 32–75)
NRBC # BLD: 0.02 K/UL (ref 0–0.01)
NRBC BLD-RTO: 0.1 PER 100 WBC
OPIATES UR QL: NEGATIVE
PCP UR QL: NEGATIVE
PLATELET # BLD AUTO: 232 K/UL (ref 150–400)
PMV BLD AUTO: 10.8 FL (ref 8.9–12.9)
POTASSIUM SERPL-SCNC: 3.8 MMOL/L (ref 3.5–5.1)
PROT SERPL-MCNC: 6.1 G/DL (ref 6.4–8.2)
PROT UR-MCNC: 28 MG/DL (ref 0–11.9)
PROT/CREAT UR-RTO: 0.1
RBC # BLD AUTO: 3.64 M/UL (ref 3.8–5.2)
SODIUM SERPL-SCNC: 139 MMOL/L (ref 136–145)
SPECIMEN EXP DATE BLD: NORMAL
WBC # BLD AUTO: 13.5 K/UL (ref 3.6–11)

## 2022-08-16 PROCEDURE — 80307 DRUG TEST PRSMV CHEM ANLYZR: CPT

## 2022-08-16 PROCEDURE — 86900 BLOOD TYPING SEROLOGIC ABO: CPT

## 2022-08-16 PROCEDURE — 3E0P7VZ INTRODUCTION OF HORMONE INTO FEMALE REPRODUCTIVE, VIA NATURAL OR ARTIFICIAL OPENING: ICD-10-PCS | Performed by: STUDENT IN AN ORGANIZED HEALTH CARE EDUCATION/TRAINING PROGRAM

## 2022-08-16 PROCEDURE — 3E033VJ INTRODUCTION OF OTHER HORMONE INTO PERIPHERAL VEIN, PERCUTANEOUS APPROACH: ICD-10-PCS | Performed by: STUDENT IN AN ORGANIZED HEALTH CARE EDUCATION/TRAINING PROGRAM

## 2022-08-16 PROCEDURE — 36415 COLL VENOUS BLD VENIPUNCTURE: CPT

## 2022-08-16 PROCEDURE — 74011250637 HC RX REV CODE- 250/637: Performed by: STUDENT IN AN ORGANIZED HEALTH CARE EDUCATION/TRAINING PROGRAM

## 2022-08-16 PROCEDURE — 85025 COMPLETE CBC W/AUTO DIFF WBC: CPT

## 2022-08-16 PROCEDURE — 84156 ASSAY OF PROTEIN URINE: CPT

## 2022-08-16 PROCEDURE — 65410000002 HC RM PRIVATE OB

## 2022-08-16 PROCEDURE — 80053 COMPREHEN METABOLIC PANEL: CPT

## 2022-08-16 RX ORDER — SODIUM CHLORIDE 0.9 % (FLUSH) 0.9 %
5-40 SYRINGE (ML) INJECTION EVERY 8 HOURS
Status: CANCELLED | OUTPATIENT
Start: 2022-08-16

## 2022-08-16 RX ORDER — NALOXONE HYDROCHLORIDE 0.4 MG/ML
0.4 INJECTION, SOLUTION INTRAMUSCULAR; INTRAVENOUS; SUBCUTANEOUS AS NEEDED
Status: DISCONTINUED | OUTPATIENT
Start: 2022-08-16 | End: 2022-08-17

## 2022-08-16 RX ORDER — BUTORPHANOL TARTRATE 2 MG/ML
1 INJECTION INTRAMUSCULAR; INTRAVENOUS
Status: DISCONTINUED | OUTPATIENT
Start: 2022-08-16 | End: 2022-08-19 | Stop reason: HOSPADM

## 2022-08-16 RX ORDER — SODIUM CHLORIDE 0.9 % (FLUSH) 0.9 %
5-40 SYRINGE (ML) INJECTION AS NEEDED
Status: CANCELLED | OUTPATIENT
Start: 2022-08-16

## 2022-08-16 RX ORDER — SODIUM CHLORIDE, SODIUM LACTATE, POTASSIUM CHLORIDE, CALCIUM CHLORIDE 600; 310; 30; 20 MG/100ML; MG/100ML; MG/100ML; MG/100ML
125 INJECTION, SOLUTION INTRAVENOUS CONTINUOUS
Status: CANCELLED | OUTPATIENT
Start: 2022-08-16

## 2022-08-16 RX ORDER — ONDANSETRON 2 MG/ML
4 INJECTION INTRAMUSCULAR; INTRAVENOUS
Status: DISCONTINUED | OUTPATIENT
Start: 2022-08-16 | End: 2022-08-17

## 2022-08-16 RX ORDER — OXYTOCIN/RINGER'S LACTATE 30/500 ML
87.3 PLASTIC BAG, INJECTION (ML) INTRAVENOUS AS NEEDED
Status: CANCELLED | OUTPATIENT
Start: 2022-08-16

## 2022-08-16 RX ORDER — ZOLPIDEM TARTRATE 5 MG/1
5 TABLET ORAL
Status: DISCONTINUED | OUTPATIENT
Start: 2022-08-16 | End: 2022-08-19 | Stop reason: HOSPADM

## 2022-08-16 RX ORDER — OXYTOCIN/RINGER'S LACTATE 30/500 ML
10 PLASTIC BAG, INJECTION (ML) INTRAVENOUS AS NEEDED
Status: CANCELLED | OUTPATIENT
Start: 2022-08-16

## 2022-08-16 RX ADMIN — Medication 25 MCG: at 19:54

## 2022-08-16 NOTE — H&P
Obstetrics Admission History & Physical    Name: Emmett Lindsay MRN: [id number]  SSN: [id number]    YOB: 1990  Age: 28 y.o. Sex: female      Subjective:     Reason for Admission:  Pregnancy and GHTN    History of Present Illness: Emmett Lindsay is a 28 y.o.  @ 37w0d who presents for IOL in setting of GHTN. Doing well today. Good FM. No LOF, VB, ctx. No HA, vision changes, RUQ pain, increased edema from baseline. Pregnancy c/b:  - GHTN - Normal labs; PrCr 0.15  - Obesity - BMI 41  - Asthma - Inhaler  - Anxeity - Zoloft initially but has since stopped    OB History          2    Para   1    Term   1            AB        Living   1         SAB        IAB        Ectopic        Molar        Multiple   0    Live Births   1              Past Medical History:   Diagnosis Date    Asthma      Past Surgical History:   Procedure Laterality Date    HX OTHER SURGICAL      eczema     Social History     Socioeconomic History    Marital status: SINGLE     Spouse name: Not on file    Number of children: Not on file    Years of education: Not on file    Highest education level: Not on file   Occupational History    Not on file   Tobacco Use    Smoking status: Never    Smokeless tobacco: Never   Substance and Sexual Activity    Alcohol use: No    Drug use: Not on file    Sexual activity: Yes     Partners: Male   Other Topics Concern    Not on file   Social History Narrative    Not on file     Social Determinants of Health     Financial Resource Strain: Not on file   Food Insecurity: Not on file   Transportation Needs: Not on file   Physical Activity: Not on file   Stress: Not on file   Social Connections: Not on file   Intimate Partner Violence: Not on file   Housing Stability: Not on file     No family history on file. Allergies   Allergen Reactions    Peanut Hives    Seafood Hives     Prior to Admission medications    Medication Sig Start Date End Date Taking?  Authorizing Provider aspirin 81 mg chewable tablet Take 81 mg by mouth daily. Yes Provider, Historical   PNV No.40-Iron Fum-FA Cmb No.1 27-1 mg tab Take  by mouth. Yes Provider, Historical   butalbital-acetaminophen-caffeine (FIORICET, ESGIC) -40 mg per tablet TAKE 1 TABLET BY MOUTH EVERY 6 HOURS AS NEEDED FOR HEADACHE  Patient not taking: Reported on 2022   Kuldip Galo NP   Cetirizine (ZyrTEC) 10 mg cap Take  by mouth. Patient not taking: Reported on 2022    Provider, Historical        Review of Systems:  A comprehensive review of systems was negative except for that written in the History of Present Illness. Objective:     Vitals:  Blood pressure (!) 140/77, pulse 97, temperature 98.3 °F (36.8 °C), resp. rate 16, height 5' 7\" (1.702 m), weight 128.4 kg (283 lb), SpO2 98 %, unknown if currently breastfeeding. Temp (24hrs), Av.3 °F (36.8 °C), Min:98.3 °F (36.8 °C), Max:98.3 °F (36.8 °C)    BP  Min: 140/77  Max: 140/77     Physical Exam:  Patient without distress.   Heart: Regular rate and rhythm  Lung: normal respiratory effort  Abdomen: soft, nontender  Cervical Exam: closed/long/high       Membranes:  Intact    Uterine Activity:  None    Fetal Heart Rate:  Reactive  Baseline: 130 per minute  Variability: moderate  Accelerations: yes  Decelerations: none       Labs:   Recent Results (from the past 24 hour(s))   CBC WITH AUTOMATED DIFF    Collection Time: 22  4:52 PM   Result Value Ref Range    WBC 13.5 (H) 3.6 - 11.0 K/uL    RBC 3.64 (L) 3.80 - 5.20 M/uL    HGB 11.1 (L) 11.5 - 16.0 g/dL    HCT 33.4 (L) 35.0 - 47.0 %    MCV 91.8 80.0 - 99.0 FL    MCH 30.5 26.0 - 34.0 PG    MCHC 33.2 30.0 - 36.5 g/dL    RDW 14.7 (H) 11.5 - 14.5 %    PLATELET 490 947 - 305 K/uL    MPV 10.8 8.9 - 12.9 FL    NRBC 0.1 (H) 0  WBC    ABSOLUTE NRBC 0.02 (H) 0.00 - 0.01 K/uL    NEUTROPHILS 67 32 - 75 %    LYMPHOCYTES 22 12 - 49 %    MONOCYTES 10 5 - 13 %    EOSINOPHILS 1 0 - 7 %    BASOPHILS 0 0 - 1 % IMMATURE GRANULOCYTES 0 0.0 - 0.5 %    ABS. NEUTROPHILS 8.9 (H) 1.8 - 8.0 K/UL    ABS. LYMPHOCYTES 2.9 0.8 - 3.5 K/UL    ABS. MONOCYTES 1.4 (H) 0.0 - 1.0 K/UL    ABS. EOSINOPHILS 0.2 0.0 - 0.4 K/UL    ABS. BASOPHILS 0.0 0.0 - 0.1 K/UL    ABS. IMM.  GRANS. 0.1 (H) 0.00 - 0.04 K/UL    DF AUTOMATED         Assessment and Plan:     29 yo  @ 37w0d who presents for IOL for GHTN  - Repeat baseline labs  - Cervix closed - will start IOL w cytotec  - GBS neg    Signed By:  Danita Bernal MD     2022

## 2022-08-16 NOTE — PROGRESS NOTES
1604 to Labor and delivery    1921- Bedside and Verbal shift change report given to [facility]  (oncoming nurse) by MICHELLE Maciel RN  (offgoing nurse). Report included the following information SBAR.

## 2022-08-17 ENCOUNTER — ANESTHESIA EVENT (OUTPATIENT)
Dept: LABOR AND DELIVERY | Age: 32
End: 2022-08-17
Payer: COMMERCIAL

## 2022-08-17 ENCOUNTER — ANESTHESIA (OUTPATIENT)
Dept: LABOR AND DELIVERY | Age: 32
End: 2022-08-17
Payer: COMMERCIAL

## 2022-08-17 PROCEDURE — 77030014125 HC TY EPDRL BBMI -B: Performed by: ANESTHESIOLOGY

## 2022-08-17 PROCEDURE — 74011250636 HC RX REV CODE- 250/636: Performed by: STUDENT IN AN ORGANIZED HEALTH CARE EDUCATION/TRAINING PROGRAM

## 2022-08-17 PROCEDURE — 74011250637 HC RX REV CODE- 250/637: Performed by: STUDENT IN AN ORGANIZED HEALTH CARE EDUCATION/TRAINING PROGRAM

## 2022-08-17 PROCEDURE — 76060000078 HC EPIDURAL ANESTHESIA

## 2022-08-17 PROCEDURE — 75410000000 HC DELIVERY VAGINAL/SINGLE

## 2022-08-17 PROCEDURE — 0KQM0ZZ REPAIR PERINEUM MUSCLE, OPEN APPROACH: ICD-10-PCS | Performed by: STUDENT IN AN ORGANIZED HEALTH CARE EDUCATION/TRAINING PROGRAM

## 2022-08-17 PROCEDURE — 10907ZC DRAINAGE OF AMNIOTIC FLUID, THERAPEUTIC FROM PRODUCTS OF CONCEPTION, VIA NATURAL OR ARTIFICIAL OPENING: ICD-10-PCS | Performed by: STUDENT IN AN ORGANIZED HEALTH CARE EDUCATION/TRAINING PROGRAM

## 2022-08-17 PROCEDURE — 65410000002 HC RM PRIVATE OB

## 2022-08-17 PROCEDURE — 74011000250 HC RX REV CODE- 250

## 2022-08-17 PROCEDURE — 75410000002 HC LABOR FEE PER 1 HR

## 2022-08-17 PROCEDURE — 75410000003 HC RECOV DEL/VAG/CSECN EA 0.5 HR

## 2022-08-17 PROCEDURE — 74011250636 HC RX REV CODE- 250/636: Performed by: OBSTETRICS & GYNECOLOGY

## 2022-08-17 PROCEDURE — 74011000250 HC RX REV CODE- 250: Performed by: ANESTHESIOLOGY

## 2022-08-17 PROCEDURE — 00HU33Z INSERTION OF INFUSION DEVICE INTO SPINAL CANAL, PERCUTANEOUS APPROACH: ICD-10-PCS | Performed by: ANESTHESIOLOGY

## 2022-08-17 RX ORDER — NALOXONE HYDROCHLORIDE 0.4 MG/ML
0.4 INJECTION, SOLUTION INTRAMUSCULAR; INTRAVENOUS; SUBCUTANEOUS AS NEEDED
Status: DISCONTINUED | OUTPATIENT
Start: 2022-08-17 | End: 2022-08-19 | Stop reason: HOSPADM

## 2022-08-17 RX ORDER — FACIAL-BODY WIPES
10 EACH TOPICAL DAILY PRN
Status: DISCONTINUED | OUTPATIENT
Start: 2022-08-17 | End: 2022-08-19 | Stop reason: HOSPADM

## 2022-08-17 RX ORDER — BUPIVACAINE HYDROCHLORIDE AND EPINEPHRINE 2.5; 5 MG/ML; UG/ML
INJECTION, SOLUTION EPIDURAL; INFILTRATION; INTRACAUDAL; PERINEURAL
Status: COMPLETED
Start: 2022-08-17 | End: 2022-08-17

## 2022-08-17 RX ORDER — SIMETHICONE 80 MG
80 TABLET,CHEWABLE ORAL
Status: DISCONTINUED | OUTPATIENT
Start: 2022-08-17 | End: 2022-08-19 | Stop reason: HOSPADM

## 2022-08-17 RX ORDER — DIPHENHYDRAMINE HCL 25 MG
25 CAPSULE ORAL
Status: DISCONTINUED | OUTPATIENT
Start: 2022-08-17 | End: 2022-08-19 | Stop reason: HOSPADM

## 2022-08-17 RX ORDER — OXYTOCIN/RINGER'S LACTATE 30/500 ML
0-20 PLASTIC BAG, INJECTION (ML) INTRAVENOUS
Status: DISCONTINUED | OUTPATIENT
Start: 2022-08-17 | End: 2022-08-19 | Stop reason: HOSPADM

## 2022-08-17 RX ORDER — ACETAMINOPHEN 325 MG/1
650 TABLET ORAL
Status: DISCONTINUED | OUTPATIENT
Start: 2022-08-17 | End: 2022-08-19 | Stop reason: HOSPADM

## 2022-08-17 RX ORDER — OXYCODONE AND ACETAMINOPHEN 5; 325 MG/1; MG/1
1 TABLET ORAL
Status: DISCONTINUED | OUTPATIENT
Start: 2022-08-17 | End: 2022-08-19 | Stop reason: HOSPADM

## 2022-08-17 RX ORDER — FENTANYL/BUPIVACAINE/NS/PF 2-1250MCG
PREFILLED PUMP RESERVOIR EPIDURAL
Status: COMPLETED
Start: 2022-08-17 | End: 2022-08-17

## 2022-08-17 RX ORDER — ACETAMINOPHEN 325 MG/1
325 TABLET ORAL
Status: DISCONTINUED | OUTPATIENT
Start: 2022-08-17 | End: 2022-08-19 | Stop reason: HOSPADM

## 2022-08-17 RX ORDER — FENTANYL/BUPIVACAINE/NS/PF 2-1250MCG
1-16 PREFILLED PUMP RESERVOIR EPIDURAL CONTINUOUS
Status: DISCONTINUED | OUTPATIENT
Start: 2022-08-17 | End: 2022-08-17

## 2022-08-17 RX ORDER — ONDANSETRON 4 MG/1
4 TABLET, ORALLY DISINTEGRATING ORAL
Status: ACTIVE | OUTPATIENT
Start: 2022-08-17 | End: 2022-08-18

## 2022-08-17 RX ORDER — BUPIVACAINE HYDROCHLORIDE AND EPINEPHRINE 2.5; 5 MG/ML; UG/ML
INJECTION, SOLUTION EPIDURAL; INFILTRATION; INTRACAUDAL; PERINEURAL AS NEEDED
Status: DISCONTINUED | OUTPATIENT
Start: 2022-08-17 | End: 2022-08-17 | Stop reason: HOSPADM

## 2022-08-17 RX ORDER — OXYTOCIN/RINGER'S LACTATE 30/500 ML
10 PLASTIC BAG, INJECTION (ML) INTRAVENOUS AS NEEDED
Status: COMPLETED | OUTPATIENT
Start: 2022-08-17 | End: 2022-08-17

## 2022-08-17 RX ORDER — SODIUM CHLORIDE 0.9 % (FLUSH) 0.9 %
5-40 SYRINGE (ML) INJECTION AS NEEDED
Status: DISCONTINUED | OUTPATIENT
Start: 2022-08-17 | End: 2022-08-17

## 2022-08-17 RX ORDER — HYDROCORTISONE ACETATE PRAMOXINE HCL 2.5; 1 G/100G; G/100G
CREAM TOPICAL AS NEEDED
Status: DISCONTINUED | OUTPATIENT
Start: 2022-08-17 | End: 2022-08-19 | Stop reason: HOSPADM

## 2022-08-17 RX ORDER — IBUPROFEN 400 MG/1
800 TABLET ORAL EVERY 8 HOURS
Status: DISCONTINUED | OUTPATIENT
Start: 2022-08-17 | End: 2022-08-19 | Stop reason: HOSPADM

## 2022-08-17 RX ORDER — SODIUM CHLORIDE, SODIUM LACTATE, POTASSIUM CHLORIDE, CALCIUM CHLORIDE 600; 310; 30; 20 MG/100ML; MG/100ML; MG/100ML; MG/100ML
125 INJECTION, SOLUTION INTRAVENOUS CONTINUOUS
Status: DISCONTINUED | OUTPATIENT
Start: 2022-08-17 | End: 2022-08-19 | Stop reason: HOSPADM

## 2022-08-17 RX ORDER — SODIUM CHLORIDE 0.9 % (FLUSH) 0.9 %
5-40 SYRINGE (ML) INJECTION EVERY 8 HOURS
Status: DISCONTINUED | OUTPATIENT
Start: 2022-08-17 | End: 2022-08-17

## 2022-08-17 RX ORDER — OXYTOCIN/RINGER'S LACTATE 30/500 ML
87.3 PLASTIC BAG, INJECTION (ML) INTRAVENOUS AS NEEDED
Status: DISCONTINUED | OUTPATIENT
Start: 2022-08-17 | End: 2022-08-19 | Stop reason: HOSPADM

## 2022-08-17 RX ADMIN — BUTORPHANOL TARTRATE 1 MG: 2 INJECTION, SOLUTION INTRAMUSCULAR; INTRAVENOUS at 08:00

## 2022-08-17 RX ADMIN — BUPIVACAINE HYDROCHLORIDE AND EPINEPHRINE 5 ML: 2.5; 5 INJECTION, SOLUTION EPIDURAL; INFILTRATION; INTRACAUDAL; PERINEURAL at 15:00

## 2022-08-17 RX ADMIN — BUPIVACAINE HYDROCHLORIDE AND EPINEPHRINE 3 ML: 2.5; 5 INJECTION, SOLUTION EPIDURAL; INFILTRATION; INTRACAUDAL; PERINEURAL at 14:57

## 2022-08-17 RX ADMIN — Medication 12 ML/HR: at 15:14

## 2022-08-17 RX ADMIN — Medication 25 MCG: at 04:10

## 2022-08-17 RX ADMIN — OXYTOCIN 10000 MILLI-UNITS: 10 INJECTION INTRAVENOUS at 17:05

## 2022-08-17 RX ADMIN — BUTORPHANOL TARTRATE 1 MG: 2 INJECTION, SOLUTION INTRAMUSCULAR; INTRAVENOUS at 12:20

## 2022-08-17 RX ADMIN — BUPIVACAINE HYDROCHLORIDE AND EPINEPHRINE 2 ML: 2.5; 5 INJECTION, SOLUTION EPIDURAL; INFILTRATION; INTRACAUDAL; PERINEURAL at 14:59

## 2022-08-17 RX ADMIN — BUPIVACAINE HYDROCHLORIDE AND EPINEPHRINE 3 ML: 2.5; 5 INJECTION, SOLUTION EPIDURAL; INFILTRATION; INTRACAUDAL; PERINEURAL at 14:58

## 2022-08-17 RX ADMIN — SODIUM CHLORIDE, POTASSIUM CHLORIDE, SODIUM LACTATE AND CALCIUM CHLORIDE 999 ML/HR: 600; 310; 30; 20 INJECTION, SOLUTION INTRAVENOUS at 14:03

## 2022-08-17 RX ADMIN — OXYTOCIN 1 MILLI-UNITS/MIN: 10 INJECTION INTRAVENOUS at 09:48

## 2022-08-17 RX ADMIN — SODIUM CHLORIDE, POTASSIUM CHLORIDE, SODIUM LACTATE AND CALCIUM CHLORIDE 125 ML: 600; 310; 30; 20 INJECTION, SOLUTION INTRAVENOUS at 09:48

## 2022-08-17 RX ADMIN — Medication 25 MCG: at 00:02

## 2022-08-17 NOTE — ANESTHESIA PROCEDURE NOTES
Epidural Block    Patient location during procedure: OB  Start time: 8/17/2022 2:51 PM  End time: 8/17/2022 3:00 PM  Reason for block: labor epidural  Staffing  Performed: attending   Anesthesiologist: Db Baca MD  Preanesthetic Checklist  Completed: patient identified, IV checked, site marked, risks and benefits discussed, surgical consent, monitors and equipment checked, pre-op evaluation, timeout performed and fire risk safety assessment completed and verbalized  Block Placement  Patient position: sitting  Prep: DuraPrep  Sterility prep: cap, drape and gloves  Sedation level: no sedation  Patient monitoring: continuous pulse oximetry, frequent blood pressure checks and heart rate  Approach: midline  Location: lumbar  Lumbar location: L3-L4  Epidural  Loss of resistance technique: air  Guidance: landmark technique  Needle  Needle type: Tuohy   Needle gauge: 17 G  Needle length: 9 cm  Needle insertion depth: 8.5 cm  Catheter type: end hole  Catheter size: 19 G  Catheter at skin depth: 13 cm  Catheter securement method: surgical tape and clear occlusive dressing  Test dose: negative  Assessment  Block outcome: pain improved  Number of attempts: 1  Procedure assessment: patient tolerated procedure well with no immediate complications

## 2022-08-17 NOTE — PROGRESS NOTES
22 0903   Cervical Exam   Dilation (cm) 1   Eff 70 %   Station -3   Baby Position Vertex    37 1/7 wks indxn for GHTN s/p cytotec. Cook CRB placed using sterile technique and both balloons inflated with 60cc/60cc. Patient tolerated well.   -Pitocin per unit protocol  -CEFM  -patient desires epidural  -Monitor BPs- normal to date

## 2022-08-17 NOTE — PROGRESS NOTES
Problem: Vaginal Delivery: Day of Deliver-Laboring  Goal: Activity/Safety  Outcome: Progressing Towards Goal  Goal: Nutrition/Diet  Outcome: Progressing Towards Goal  Goal: Medications  Outcome: Progressing Towards Goal  Goal: Respiratory  Outcome: Progressing Towards Goal  Goal: Treatments/Interventions/Procedures  Outcome: Progressing Towards Goal  Goal: *Vital signs within defined limits  Outcome: Progressing Towards Goal  Goal: *Labs within defined limits  Outcome: Progressing Towards Goal  Goal: *Optimal pain control at patient's stated goal  Outcome: Progressing Towards Goal

## 2022-08-17 NOTE — L&D DELIVERY NOTE
Delivery Summary  Patient: Corinne North             Circumcision:   desires  Additional Delivery Comments - 27 yo Z2N8 s/p uncomplicated TSVD. Admitted for [address] [place] [address]. Bps WNL and labs WNL. Cytotec and cooks catheter placed. Pitocin started followed by amniotomy. Pt was found to be c/c/+2. No nuchal cord present. Anterior and then posterior shoulder delivered easily, followed by the body. Infant passed to mother and cord was clamped x2 and then cut. Placenta delivered easily and intact in < 15 min minutes. IV pitocin was administered. Perineum, vagina, and vulva were inspected for lacerations. A  2nd degree laceration was noted and repaired with 3-0 vicryl in a running fashion. Hemostasis and reapproximation was excellent. Uterine fundus was noted to be firm and below the level of the umbilicus      Information for the patient's :  Aniketwilfredo Miller, Pending [[id number]]     Delivery Type: Vaginal, Spontaneous   Delivery Date: 2022   Delivery Time: 5:02 PM     Birth Weight:       Sex:  child  Delivery Clinician:  Joyce Cannon   Gestational Age: 42w4d    Presentation: Vertex   Position:             Apgars were   and       Resuscitation Method: Tactile Stimulation;Suctioning-bulb     Meconium Stained: None    Living Status: Living       Placenta Date/Time: 2022  5:04 PM   Placenta Removal: Spontaneous   Placenta Appearance: Intact    Cord Information: 3 Vessels    Cord Events: None       Disposition of Cord Blood: Discard    Blood Gases Sent?:  No     Cord pH:  none    Episiotomy:    Laceration(s): 2nd     Estimated Blood Loss (ml): No data found    Labor Events  Method: AROM; Oxytocin      Augmentation: None   Cervical Ripening:     Misoprostol        Operative Vaginal Delivery - none

## 2022-08-17 NOTE — PROGRESS NOTES
Assumed care of patient.  - 0  SBAR report given to Yin RN from [facility] [employer], RN

## 2022-08-17 NOTE — PROGRESS NOTES
Patient on the monitor for 3 hours after admin of Cytotec. Patient off the monitor for a shower before pitocin at 0800.

## 2022-08-17 NOTE — PROGRESS NOTES
22 1322   Cervical Exam   Dilation (cm) 5   Eff 70 %   Station -2   Baby Position Vertex   Membrane Status AROM  (clear)    at 37 1/7 wks indxn for GHTN s/p Cytotec and CRB.  CRB balloon removed and AROM performed  -Pitocin per unit protocol  -CEFM  -normal BPs currently - continue to monitor

## 2022-08-17 NOTE — ROUTINE PROCESS
TRANSFER - IN REPORT:    Verbal report received from AIDEE Busch RN (name) on Syed Khan  being received from L & D (unit) for routine progression of care      Report consisted of patients Situation, Background, Assessment and   Recommendations(SBAR). Information from the following report(s) SBAR, Kardex, Procedure Summary, Intake/Output, MAR, and Recent Results was reviewed with the receiving nurse. Opportunity for questions and clarification was provided. Assessment completed upon patients arrival to unit and care assumed.

## 2022-08-17 NOTE — ANESTHESIA PREPROCEDURE EVALUATION
Relevant Problems   NEUROLOGY   (+) Recurrent depression (HCC)      ENDOCRINE   (+) Obesity, morbid (HCC)       Anesthetic History   No history of anesthetic complications            Review of Systems / Medical History  Patient summary reviewed and pertinent labs reviewed    Pulmonary            Asthma        Neuro/Psych   Within defined limits           Cardiovascular  Within defined limits                Exercise tolerance: >4 METS     GI/Hepatic/Renal  Within defined limits              Endo/Other        Morbid obesity     Other Findings              Physical Exam    Airway  Mallampati: III  TM Distance: 4 - 6 cm  Neck ROM: normal range of motion   Mouth opening: Normal     Cardiovascular  Regular rate and rhythm,  S1 and S2 normal,  no murmur, click, rub, or gallop  Rhythm: regular  Rate: normal         Dental  No notable dental hx       Pulmonary  Breath sounds clear to auscultation               Abdominal  GI exam deferred       Other Findings            Anesthetic Plan    ASA: 3  Anesthesia type: epidural      Post-op pain plan if not by surgeon: indwelling epidural catheter      Anesthetic plan and risks discussed with: Patient

## 2022-08-17 NOTE — PROGRESS NOTES
22 1604   Cervical Exam   Dilation (cm) 7   Eff 90 %   Station +1   Baby Position Vertex   Membrane Status AROM    at 37 1/7 wks indxn for GHTN s/p Cytotec, CRB and AROM   -Pitocin per unit protocol  -CEFM  -normal BPs currently - continue to monitor

## 2022-08-17 NOTE — ANESTHESIA POSTPROCEDURE EVALUATION
* No procedures listed *.    epidural    Anesthesia Post Evaluation      Multimodal analgesia: multimodal analgesia used between 6 hours prior to anesthesia start to PACU discharge  Patient location during evaluation: bedside  Patient participation: complete - patient participated  Level of consciousness: awake  Pain management: adequate  Airway patency: patent  Anesthetic complications: no  Cardiovascular status: acceptable  Respiratory status: acceptable  Hydration status: acceptable  Post anesthesia nausea and vomiting:  controlled  Final Post Anesthesia Temperature Assessment:  Normothermia (36.0-37.5 degrees C)      INITIAL Post-op Vital signs:   Vitals Value Taken Time   BP     Temp     Pulse     Resp     SpO2 96 % 08/17/22 4689   Vitals shown include unvalidated device data.

## 2022-08-17 NOTE — PROGRESS NOTES
Problem: Vaginal Delivery: Day of Deliver-Laboring  Goal: Off Pathway (Use only if patient is Off Pathway)  Outcome: Resolved/Met  Goal: Activity/Safety  8/17/2022 1750 by Seun Sainz RN  Outcome: Resolved/Met  8/17/2022 0817 by Seun Sainz RN  Outcome: Progressing Towards Goal  Goal: Consults, if ordered  Outcome: Resolved/Met  Goal: Diagnostic Test/Procedures  Outcome: Resolved/Met  Goal: Nutrition/Diet  8/17/2022 1750 by Seun Sainz RN  Outcome: Resolved/Met  8/17/2022 0817 by Seun Sainz RN  Outcome: Progressing Towards Goal  Goal: Discharge Planning  Outcome: Resolved/Met  Goal: Medications  8/17/2022 1750 by Seun Sainz RN  Outcome: Resolved/Met  8/17/2022 0817 by Seun Sainz RN  Outcome: Progressing Towards Goal  Goal: Respiratory  8/17/2022 1750 by Seun Sainz RN  Outcome: Resolved/Met  8/17/2022 0817 by Seun Sainz RN  Outcome: Progressing Towards Goal  Goal: Treatments/Interventions/Procedures  8/17/2022 1750 by Seun Sainz RN  Outcome: Resolved/Met  8/17/2022 0817 by Seun Sainz RN  Outcome: Progressing Towards Goal  Goal: *Vital signs within defined limits  8/17/2022 1750 by Seun Sainz RN  Outcome: Resolved/Met  8/17/2022 0817 by Seun Sainz RN  Outcome: Progressing Towards Goal  Goal: *Labs within defined limits  8/17/2022 1750 by Seun Sainz RN  Outcome: Resolved/Met  8/17/2022 0817 by Seun Sainz RN  Outcome: Progressing Towards Goal  Goal: *Hemodynamically stable  Outcome: Resolved/Met  Goal: *Optimal pain control at patient's stated goal  8/17/2022 1750 by Seun Sainz RN  Outcome: Resolved/Met  8/17/2022 0817 by Seun Sainz RN  Outcome: Progressing Towards Goal

## 2022-08-17 NOTE — PROGRESS NOTES
1925- Care assumed of pt. Pt assisted up to bathroom. [address] adjusted. Pt provided with medication education on Miso and first dose given. Pt verbalized understanding.

## 2022-08-17 NOTE — PROGRESS NOTES
2753: Bedside and Verbal shift change report given to AIDEE Busch RN (oncoming nurse) by Nelia Mendieta. Sandra Blas RN (offgoing nurse). Report included the following information SBAR and Kardex. 1447: Anesthesia at bedside at 1447. Patient positioned to side of the bed, EFM & TOCO adjusted to accommodate sterile field. Difficulty tracing due to maternal position. Time out completed at 1449. Successful epidural is completed by Dr Lisandro Gutiérrez. Patient is repositioned supine in bed with wedge under right hip. EFM and TOCO replaced and blood pressure frequency increased. Rushing catheter placed and epidural continuous infusion is started. 1657: Patient begins pushing. RN remains in continuous attendance at the bedside. Assessment & evaluation of fetal heart rate ongoing via continuous EFM.     1702:  of live baby boy     : Bedside and Verbal shift change report given to EMILY Pressley (oncoming nurse) by AIDEE Busch RN (offgoing nurse). Report included the following information SBAR and Kardex.

## 2022-08-18 PROCEDURE — 74011250637 HC RX REV CODE- 250/637: Performed by: STUDENT IN AN ORGANIZED HEALTH CARE EDUCATION/TRAINING PROGRAM

## 2022-08-18 PROCEDURE — 65410000002 HC RM PRIVATE OB

## 2022-08-18 RX ADMIN — OXYCODONE AND ACETAMINOPHEN 1 TABLET: 5; 325 TABLET ORAL at 02:16

## 2022-08-18 RX ADMIN — OXYCODONE AND ACETAMINOPHEN 1 TABLET: 5; 325 TABLET ORAL at 21:04

## 2022-08-18 RX ADMIN — IBUPROFEN 800 MG: 400 TABLET, FILM COATED ORAL at 00:39

## 2022-08-18 RX ADMIN — OXYCODONE AND ACETAMINOPHEN 1 TABLET: 5; 325 TABLET ORAL at 21:03

## 2022-08-18 RX ADMIN — IBUPROFEN 800 MG: 400 TABLET, FILM COATED ORAL at 08:34

## 2022-08-18 RX ADMIN — OXYCODONE AND ACETAMINOPHEN 1 TABLET: 5; 325 TABLET ORAL at 15:57

## 2022-08-18 RX ADMIN — OXYCODONE AND ACETAMINOPHEN 1 TABLET: 5; 325 TABLET ORAL at 08:34

## 2022-08-18 RX ADMIN — IBUPROFEN 800 MG: 400 TABLET, FILM COATED ORAL at 15:57

## 2022-08-18 NOTE — PROGRESS NOTES
Bedside and Verbal shift change report given to EMILY Dick (oncoming nurse) by AIDEE Shah and SHASTA Fletcher (offgoing nurse). Report included the following information SBAR, Kardex, and MAR.

## 2022-08-18 NOTE — ROUTINE PROCESS
Bedside and Verbal shift change report given to AIDEE Juarez RN (oncoming nurse) by EMILY Short (offgoing nurse). Report included the following information SBAR, Kardex, Procedure Summary, Intake/Output, MAR, and Recent Results.

## 2022-08-18 NOTE — ROUTINE PROCESS
Bedside and Verbal shift change report given to SHASTA Fletcher RN (oncoming nurse) by AIDEE Degroot RN (offgoing nurse). Report included the following information SBAR, Kardex, Intake/Output, MAR, and Recent Results.

## 2022-08-18 NOTE — PROGRESS NOTES
Post-Partum Day Number 1 Progress Note    Jeri Amezcua     Assessment: Doing well, post partum day 1    Plan:  - Continue routine postpartum and perineal care as well as maternal education.  - The risks and benefits of the circumcision  procedure and anesthesia including: bleeding, infection, variability of cosmetic results were discussed at length with the mother. She is aware that future repeat procedures may be necessary. She gives informed consent to proceed as noted and her questions are answered. - Plan discharge home [id number] Discharge Date: Tomorrow    GHTN:   - Mild range Bps, PIH labs WNL on     . Information for the patient's :  Kyle Pavon [[id number]]   Vaginal, Spontaneous  Patient doing well without significant complaint. Voiding without difficulty, normal lochia. Vitals:  Visit Vitals  /70   Pulse 95   Temp 98.5 °F (36.9 °C)   Resp 17   Ht 5' 7\" (1.702 m)   Wt 128.4 kg (283 lb)   SpO2 96%   Breastfeeding Unknown   BMI 44.32 kg/m²     Temp (24hrs), Av.4 °F (36.9 °C), Min:98 °F (36.7 °C), Max:98.7 °F (37.1 °C)        Exam:   Patient without distress. Fundus firm, nontender per nursing fundal checks. Perineum with normal lochia noted per nursing assessment. Lower extremities are negative for pathological edema.     Labs:     Lab Results   Component Value Date/Time    WBC 13.5 (H) 2022 04:52 PM    WBC 11.7 (H) 2021 09:30 AM    WBC 14.2 (H) 2018 02:44 PM    WBC 11.0 2017 08:49 AM    WBC 14.3 (H) 2016 01:08 PM    HGB 11.1 (L) 2022 04:52 PM    HGB 12.5 2021 09:30 AM    HGB 13.0 2018 02:44 PM    HGB 12.9 2017 08:49 AM    HGB 13.4 2016 01:08 PM    HCT 33.4 (L) 2022 04:52 PM    HCT 37.0 2021 09:30 AM    HCT 38.2 2018 02:44 PM    HCT 40.2 2017 08:49 AM    HCT 39.4 2016 01:08 PM    PLATELET 572  04:52 PM    PLATELET 082  09:30 AM    PLATELET 918 (H) 10/86/9860 02:44 PM    PLATELET 717 46/57/0684 08:49 AM    PLATELET 924 36/32/0792 01:08 PM    Hgb, External NEGATIVE 07/02/2015 12:00 AM       No results found for this or any previous visit (from the past 24 hour(s)).     See Winkler MD  Obstetrics and Gynecology   [facility] [place] [facility] [place]

## 2022-08-19 VITALS
WEIGHT: 283 LBS | HEIGHT: 67 IN | SYSTOLIC BLOOD PRESSURE: 115 MMHG | RESPIRATION RATE: 16 BRPM | BODY MASS INDEX: 44.42 KG/M2 | HEART RATE: 77 BPM | TEMPERATURE: 98 F | OXYGEN SATURATION: 99 % | DIASTOLIC BLOOD PRESSURE: 61 MMHG

## 2022-08-19 PROCEDURE — 74011250637 HC RX REV CODE- 250/637: Performed by: STUDENT IN AN ORGANIZED HEALTH CARE EDUCATION/TRAINING PROGRAM

## 2022-08-19 RX ORDER — OXYCODONE AND ACETAMINOPHEN 5; 325 MG/1; MG/1
1 TABLET ORAL
Qty: 12 TABLET | Refills: 0 | Status: SHIPPED | OUTPATIENT
Start: 2022-08-19 | End: 2022-08-22

## 2022-08-19 RX ORDER — IBUPROFEN 800 MG/1
800 TABLET ORAL
Qty: 30 TABLET | Refills: 0 | Status: SHIPPED | OUTPATIENT
Start: 2022-08-19 | End: 2022-10-24

## 2022-08-19 RX ADMIN — OXYCODONE AND ACETAMINOPHEN 1 TABLET: 5; 325 TABLET ORAL at 07:10

## 2022-08-19 RX ADMIN — IBUPROFEN 800 MG: 400 TABLET, FILM COATED ORAL at 00:21

## 2022-08-19 RX ADMIN — OXYCODONE AND ACETAMINOPHEN 1 TABLET: 5; 325 TABLET ORAL at 07:09

## 2022-08-19 RX ADMIN — OXYCODONE AND ACETAMINOPHEN 1 TABLET: 5; 325 TABLET ORAL at 11:21

## 2022-08-19 RX ADMIN — IBUPROFEN 800 MG: 400 TABLET, FILM COATED ORAL at 09:05

## 2022-08-19 NOTE — ROUTINE PROCESS
Patient discharge prescriptions & instructions given. Verbalized understanding. All questions answered. No distress noted. Signed copy of discharge instructions on paper chart.  Infant to stay for repeat bili, patient medicated just prior to discharge

## 2022-08-19 NOTE — ROUTINE PROCESS
Bedside and Verbal shift change report given to ANUJ Dickens RN (oncoming nurse) by EMILY Garcia (offgoing nurse). Report included the following information SBAR, Kardex, ED Summary, Procedure Summary, Intake/Output, MAR, and Recent Results.

## 2022-08-19 NOTE — PROGRESS NOTES
Post-Partum Day Number 2 Progress Note    Wil Amezcua     Assessment: Doing well, post partum day 2    Plan:   - Discharge home today  - Follow up in office in 1 week(s) with [facility] for bp check due to ghtn.  - ghtn - normal bp this am. Intermittent mild range. To check bps at home, follow up in one week. - Pain medication prescription(s) sent. - Questions answered. Information for the patient's :  Little Maryel [[id number]]   Vaginal, Spontaneous  Patient doing well without significant complaint. Voiding without difficulty, normal lochia. Ready for discharge home. Vitals:  Visit Vitals  /61   Pulse 77   Temp 98 °F (36.7 °C)   Resp 16   Ht 5' 7\" (1.702 m)   Wt 128.4 kg (283 lb)   SpO2 99%   Breastfeeding Unknown   BMI 44.32 kg/m²     Temp (24hrs), Av °F (36.7 °C), Min:97.8 °F (36.6 °C), Max:98.2 °F (36.8 °C)      Exam:        Patient without distress.                 Fundus firm, nontender per nursing fundal checks                Perineum with normal lochia noted per nursing assessment                Lower extremities are negative for pathological edema    Labs:     Lab Results   Component Value Date/Time    WBC 13.5 (H) 2022 04:52 PM    WBC 11.7 (H) 2021 09:30 AM    WBC 14.2 (H) 2018 02:44 PM    WBC 11.0 2017 08:49 AM    WBC 14.3 (H) 2016 01:08 PM    HGB 11.1 (L) 2022 04:52 PM    HGB 12.5 2021 09:30 AM    HGB 13.0 2018 02:44 PM    HGB 12.9 2017 08:49 AM    HGB 13.4 2016 01:08 PM    HCT 33.4 (L) 2022 04:52 PM    HCT 37.0 2021 09:30 AM    HCT 38.2 2018 02:44 PM    HCT 40.2 2017 08:49 AM    HCT 39.4 2016 01:08 PM    PLATELET 049  04:52 PM    PLATELET 522  09:30 AM    PLATELET 862 (H) 3209 02:44 PM    PLATELET 932  08:49 AM    PLATELET 863  01:08 PM    Hgb, External NEGATIVE 2015 12:00 AM       No results found for this or any previous visit (from the past 24 hour(s)).

## 2022-08-19 NOTE — DISCHARGE INSTRUCTIONS
POST DELIVERY DISCHARGE INSTRUCTIONS    Name: Jimmy Roche  YOB: 1990  Primary Diagnosis: Active Problems:    Pregnant (8/16/2022)        General:     Diet/Diet Restrictions:  Eight 8-ounce glasses of fluid daily (water, juices); avoid excessive caffeine intake. Meals/snacks as desired which are high in fiber and carbohydrates and low in fat and cholesterol. Medications:   {Medication reconciliation information is now added to the patient's AVS automatically when it is printed. There is no need to use this SmartLink in discharge instructions. Highlight this text and delete it to clear this message}      Physical Activity / Restrictions / Safety:     Avoid heavy lifting, no more that 8 lbs. For 2-3 weeks;   Limit use of stairs to 2 times daily for the first week home. No driving for one week. Avoid intercourse 4-6 weeks, no douching or tampon use. Check with obstetrician before starting or resuming an exercise program.      Discharge Instructions/Special Treatment/Home Care Needs:     Continue prenatal vitamins. Continue to use squirt bottle with warm water on your episiotomy after each bathroom use until bleeding stops. If steri-strips applied to your incision, remove in 7-10 days. Call your doctor for the following:     Fever over 101 degrees by mouth. Vaginal bleeding heavier than a normal menstrual period or clots larger than a golf ball. Red streaks or increased swelling of legs, painful red streaks on your breast.  Painful urination, constipation and increased pain or swelling or discharge with your incision. If you feel extremely anxious or overwhelmed. If you have thoughts of harming yourself and/or your baby. Pain Management:     Take Acetaminophen (Tylenol) or Ibuprofen (Advil, Motrin), as directed for pain. Use a warm Sitz bath 3 times daily to relieve episiotomy or hemorrhoidal discomfort.    For hemorrhoidal discomfort, use Tucks and Anusol cream as needed and directed. Heating pad to  incision as needed.      Follow-Up Care:     Appointment with MD: [unfilled]  Telephone number: [phone]    Signed By: Linh Paiz MD                                                                                                   Date: 2022 Time: 9:47 AM

## 2022-08-19 NOTE — DISCHARGE SUMMARY
Obstetrical Discharge Summary     Name: Vale Mckeon MRN: [id number]  SSN: [id number]    YOB: 1990  Age: 28 y.o. Sex: female      Admit Date: 2022    Discharge Date: 2022     Admitting Physician: Jesús Sampson MD     Attending Physician:  Belkis Mancsuo MD     Admission Diagnoses: Pregnant [Z34.90]    Discharge Diagnoses:   Information for the patient's :  Claudette Gibes [[id number]]   Delivery of a 3.22 kg male infant via Vaginal, Spontaneous on 2022 at 5:02 PM  by Bk Romano. Apgars were 8  and 9 . Additional Diagnoses:   Hospital Problems  Date Reviewed: 2022            Codes Class Noted POA    Pregnant ICD-10-CM: Z34.90  ICD-9-CM: V22.2  2022 Unknown          Lab Results   Component Value Date/Time    Rubella, External Immune 2022 12:00 AM    GrBStrep, External neg 2022 12:00 AM       Hospital Course: Normal hospital course following the delivery other than mild range bps, not requiring treatment. Patient Instructions:   Current Discharge Medication List        START taking these medications    Details   ibuprofen (MOTRIN) 800 mg tablet Take 1 Tablet by mouth every eight (8) hours as needed for Pain. Qty: 30 Tablet, Refills: 0      oxyCODONE-acetaminophen (Percocet) 5-325 mg per tablet Take 1 Tablet by mouth every six (6) hours as needed for Pain for up to 3 days. Max Daily Amount: 4 Tablets. Qty: 12 Tablet, Refills: 0    Associated Diagnoses: Postpartum pain           CONTINUE these medications which have NOT CHANGED    Details   aspirin 81 mg chewable tablet Take 81 mg by mouth daily. PNV No.40-Iron Fum-FA Cmb No.1 27-1 mg tab Take  by mouth.            STOP taking these medications       butalbital-acetaminophen-caffeine (FIORICET, ESGIC) -40 mg per tablet Comments:   Reason for Stopping:         Cetirizine (ZyrTEC) 10 mg cap Comments:   Reason for Stopping:               Disposition at Discharge: Home or self care    Condition at Discharge: Stable    Reference my discharge instructions. Follow-up Appointments   Procedures    FOLLOW UP VISIT Appointment in: One Week     Standing Status:   Standing     Number of Occurrences:   1     Order Specific Question:   Appointment in     Answer:    One Week        Signed By:  Kelle Cesar MD     August 19, 2022

## 2022-09-30 ENCOUNTER — OFFICE VISIT (OUTPATIENT)
Dept: FAMILY MEDICINE CLINIC | Age: 32
End: 2022-09-30
Payer: COMMERCIAL

## 2022-09-30 VITALS — RESPIRATION RATE: 20 BRPM | OXYGEN SATURATION: 97 % | TEMPERATURE: 97.8 F | HEART RATE: 87 BPM

## 2022-09-30 DIAGNOSIS — J02.9 SORE THROAT: Primary | ICD-10-CM

## 2022-09-30 DIAGNOSIS — J06.9 UPPER RESPIRATORY TRACT INFECTION, UNSPECIFIED TYPE: ICD-10-CM

## 2022-09-30 LAB
EXP DATE SOLUTION: NORMAL
EXP DATE SWAB: NORMAL
LOT NUMBER SOLUTION: NORMAL
LOT NUMBER SWAB: NORMAL
S PYO AG THROAT QL: NEGATIVE
SARS-COV-2 RNA POC: NEGATIVE
VALID INTERNAL CONTROL?: YES

## 2022-09-30 PROCEDURE — 87880 STREP A ASSAY W/OPTIC: CPT | Performed by: NURSE PRACTITIONER

## 2022-09-30 PROCEDURE — 99213 OFFICE O/P EST LOW 20 MIN: CPT | Performed by: NURSE PRACTITIONER

## 2022-09-30 PROCEDURE — 87635 SARS-COV-2 COVID-19 AMP PRB: CPT | Performed by: NURSE PRACTITIONER

## 2022-09-30 RX ORDER — AZITHROMYCIN 250 MG/1
TABLET, FILM COATED ORAL
Qty: 6 TABLET | Refills: 0 | Status: SHIPPED | OUTPATIENT
Start: 2022-09-30 | End: 2022-10-05

## 2022-09-30 NOTE — PROGRESS NOTES
Dontrell De Dios is a 28 y.o. female presenting for/with:    Chief Complaint   Patient presents with    Sore Throat     Started on tues/Wed    Ear Pain       Visit Vitals  Pulse 87   Temp 97.8 °F (36.6 °C) (Temporal)   Resp 20   SpO2 97%     Pain Scale: /10  Pain Location:     1. \"Have you been to the ER, urgent care clinic since your last visit? Hospitalized since your last visit? \" No    2. \"Have you seen or consulted any other health care providers outside of the [address] [place] [address] since your last visit? \" No     3. For patients aged 39-70: Has the patient had a colonoscopy / FIT/ Cologuard? NA - based on age      If the patient is female:    4. For patients aged 41-77: Has the patient had a mammogram within the past 2 years? NA - based on age or sex      11. For patients aged 21-65: Has the patient had a pap smear? Yes - Care Gap present. Most recent result on file          Patient    Learning Assessment 5/13/2022   PRIMARY LEARNER Patient   PRIMARY LANGUAGE ENGLISH   LEARNER PREFERENCE PRIMARY READING   ANSWERED BY pt   RELATIONSHIP SELF     Fall Risk Assessment, last 12 mths 5/13/2022   Able to walk? Yes   Fall in past 12 months? 0   Do you feel unsteady?  0   Are you worried about falling 0       3 most recent PHQ Screens 5/13/2022   Little interest or pleasure in doing things Not at all   Feeling down, depressed, irritable, or hopeless Not at all   Total Score PHQ 2 0   Trouble falling or staying asleep, or sleeping too much -   Feeling tired or having little energy -   Poor appetite, weight loss, or overeating -   Feeling bad about yourself - or that you are a failure or have let yourself or your family down -   Trouble concentrating on things such as school, work, reading, or watching TV -   Moving or speaking so slowly that other people could have noticed; or the opposite being so fidgety that others notice -   Thoughts of being better off dead, or hurting yourself in some way -   PHQ 9 Score -   How difficult have these problems made it for you to do your work, take care of your home and get along with others -     Abuse Screening Questionnaire 5/13/2022   Do you ever feel afraid of your partner? N   Are you in a relationship with someone who physically or mentally threatens you? N   Is it safe for you to go home?  Y       ADL Assessment 5/13/2022   Feeding yourself No Help Needed   Getting from bed to chair No Help Needed   Getting dressed No Help Needed   Bathing or showering No Help Needed   Walk across the room (includes cane/walker) No Help Needed   Using the telphone No Help Needed   Taking your medications No Help Needed   Preparing meals No Help Needed   Managing money (expenses/bills) No Help Needed   Moderately strenuous housework (laundry) No Help Needed   Shopping for personal items (toiletries/medicines) No Help Needed   Shopping for groceries No Help Needed   Driving No Help Needed   Climbing a flight of stairs No Help Needed   Getting to places beyond walking distances No Help Needed      Advance Care Planning 8/16/2022   Patient's Healthcare Decision Maker is: -   Primary Decision Maker Name -   Primary Decision Maker Phone Number -   Primary Decision Maker Relationship to Patient -   Confirm Advance Directive None   Patient Would Like to Complete Advance Directive No

## 2022-10-10 LAB — PAP SMEAR, EXTERNAL: NEGATIVE

## 2022-10-21 ENCOUNTER — OFFICE VISIT (OUTPATIENT)
Dept: FAMILY MEDICINE CLINIC | Age: 32
End: 2022-10-21
Payer: COMMERCIAL

## 2022-10-21 VITALS
TEMPERATURE: 97.8 F | BODY MASS INDEX: 44.39 KG/M2 | DIASTOLIC BLOOD PRESSURE: 88 MMHG | HEIGHT: 67 IN | HEART RATE: 91 BPM | SYSTOLIC BLOOD PRESSURE: 122 MMHG | WEIGHT: 282.8 LBS | OXYGEN SATURATION: 97 % | RESPIRATION RATE: 20 BRPM

## 2022-10-21 DIAGNOSIS — L30.9 ECZEMA, UNSPECIFIED TYPE: ICD-10-CM

## 2022-10-21 DIAGNOSIS — D72.829 LEUKOCYTOSIS, UNSPECIFIED TYPE: ICD-10-CM

## 2022-10-21 DIAGNOSIS — R73.01 IMPAIRED FASTING GLUCOSE: ICD-10-CM

## 2022-10-21 DIAGNOSIS — Z00.00 ROUTINE GENERAL MEDICAL EXAMINATION AT A HEALTH CARE FACILITY: Primary | ICD-10-CM

## 2022-10-21 DIAGNOSIS — O13.3 GESTATIONAL HYPERTENSION, THIRD TRIMESTER: ICD-10-CM

## 2022-10-21 PROCEDURE — 99395 PREV VISIT EST AGE 18-39: CPT | Performed by: NURSE PRACTITIONER

## 2022-10-21 RX ORDER — DROSPIRENONE 4 MG/1
1 TABLET, FILM COATED ORAL DAILY
COMMUNITY
Start: 2022-09-29

## 2022-10-21 NOTE — PROGRESS NOTES
Tati Ledezma is a 28 y.o. female presenting for/with:    Chief Complaint   Patient presents with    Physical     States her bp was elevated during her pregnancy and a few weeks after delivery        Visit Vitals  Pulse 91   Temp 97.8 °F (36.6 °C) (Temporal)   Resp 20   Ht 5' 7\" (1.702 m)   Wt 282 lb 12.8 oz (128.3 kg)   SpO2 97%   BMI 44.29 kg/m²     Pain Scale: 0 - No pain/10  Pain Location:     1. \"Have you been to the ER, urgent care clinic since your last visit? Hospitalized since your last visit? \" Yes When: 10/13/2022 [facility]     2. \"Have you seen or consulted any other health care providers outside of the [address] [place] [address] since your last visit? \" No     3. For patients aged 39-70: Has the patient had a colonoscopy / FIT/ Cologuard? NA - based on age      If the patient is female:    4. For patients aged 41-77: Has the patient had a mammogram within the past 2 years? NA - based on age or sex      11. For patients aged 21-65: Has the patient had a pap smear? Yes - Care Gap present. Most recent result on file          Patient    Learning Assessment 5/13/2022   PRIMARY LEARNER Patient   PRIMARY LANGUAGE ENGLISH   LEARNER PREFERENCE PRIMARY READING   ANSWERED BY pt   RELATIONSHIP SELF     Fall Risk Assessment, last 12 mths 5/13/2022   Able to walk? Yes   Fall in past 12 months? 0   Do you feel unsteady?  0   Are you worried about falling 0       3 most recent PHQ Screens 10/21/2022   Little interest or pleasure in doing things Not at all   Feeling down, depressed, irritable, or hopeless Not at all   Total Score PHQ 2 0   Trouble falling or staying asleep, or sleeping too much -   Feeling tired or having little energy -   Poor appetite, weight loss, or overeating -   Feeling bad about yourself - or that you are a failure or have let yourself or your family down -   Trouble concentrating on things such as school, work, reading, or watching TV -   Moving or speaking so slowly that other people could have noticed; or the opposite being so fidgety that others notice -   Thoughts of being better off dead, or hurting yourself in some way -   PHQ 9 Score -   How difficult have these problems made it for you to do your work, take care of your home and get along with others -     Abuse Screening Questionnaire 10/21/2022   Do you ever feel afraid of your partner? N   Are you in a relationship with someone who physically or mentally threatens you? N   Is it safe for you to go home?  Y       ADL Assessment 10/21/2022   Feeding yourself No Help Needed   Getting from bed to chair No Help Needed   Getting dressed No Help Needed   Bathing or showering No Help Needed   Walk across the room (includes cane/walker) No Help Needed   Using the telphone No Help Needed   Taking your medications No Help Needed   Preparing meals No Help Needed   Managing money (expenses/bills) No Help Needed   Moderately strenuous housework (laundry) No Help Needed   Shopping for personal items (toiletries/medicines) No Help Needed   Shopping for groceries No Help Needed   Driving No Help Needed   Climbing a flight of stairs No Help Needed   Getting to places beyond walking distances No Help Needed      Advance Care Planning 8/16/2022   Patient's Healthcare Decision Maker is: -   Primary Decision Maker Name -   Primary Decision Maker Phone Number -   Primary Decision Maker Relationship to Patient -   Confirm Advance Directive None   Patient Would Like to Complete Advance Directive No

## 2022-10-21 NOTE — PROGRESS NOTES
Chief Complaint   Patient presents with    Physical     States her bp was elevated during her pregnancy and a few weeks after delivery          HPI:      Kristy Medeiros is a 28 y.o. female. Works for [employer]. In the [place] office. Has a 10year old son and a baby boy. Asthma: Uses an albuterol inhaler intermittently. Eczema: Previously on Dupixent. Managed by [facility]. Was off during pregnancy      Depression: Previously on Effexor. Held during pregnancy. New Issues:  Her BP was elevated during her pregnancy and for a few weeks after delivery. She was delivered at 37 weeks. She was seen at the [facility] ER for rash and was given Doxycycline and Prednisone. Allergies   Allergen Reactions    Peanut Hives    Seafood Hives       Current Outpatient Medications   Medication Sig    Slynd 4 mg (28) tab Take 1 Tablet by mouth daily. PNV No.40-Iron Fum-FA Cmb No.1 27-1 mg tab Take  by mouth. ibuprofen (MOTRIN) 800 mg tablet Take 1 Tablet by mouth every eight (8) hours as needed for Pain. (Patient not taking: Reported on 10/21/2022)    aspirin 81 mg chewable tablet Take 81 mg by mouth daily. (Patient not taking: Reported on 10/21/2022)     No current facility-administered medications for this visit. Past Medical History:   Diagnosis Date    Asthma        Past Surgical History:   Procedure Laterality Date    HX OTHER SURGICAL      eczema       Social History     Socioeconomic History    Marital status:    Tobacco Use    Smoking status: Never    Smokeless tobacco: Never   Vaping Use    Vaping Use: Never used   Substance and Sexual Activity    Alcohol use: No    Sexual activity: Yes     Partners: Male       History reviewed. No pertinent family history. Above history reviewed. ROS:  Denies fever, chills, cough, chest pain, SOB,  nausea, vomiting, or diarrhea. Denies wt loss, wt gain, hemoptysis, hematochezia or melena.     Physical Examination:    /88 (BP 1 Location: Left arm, BP Patient Position: Sitting)   Pulse 91   Temp 97.8 °F (36.6 °C) (Temporal)   Resp 20   Ht 5' 7\" (1.702 m)   Wt 282 lb 12.8 oz (128.3 kg)   SpO2 97%   BMI 44.29 kg/m²     General: Alert and Ox3, Fluent speech, overweight  HEENT:  PERRLA, EOM intact, TMs, turbinates, pharynx normal.  No thyromegaly. No cervical adenopathy. Neck:  Supple, no adenopathy, JVD, mass or bruit  Chest:  Clear to Ausculation, without wheezes, rales, rubs or ronchi  Cardiac: RRR  Abdomen:  +BS, soft, nontender without palpable HSM  Extremities:  No cyanosis, clubbing or edema  Neurologic:  Ambulatory without assist, CN 2-12 grossly intact. Moves all extremities. Skin: dry, excoriated rash around eyes and mouth. Lymphadenopathy: no cervical or supraclavicular nodes    ASSESSMENT AND PLAN:     1. Routine general medical examination at a health care facility  Plan for flu shot in 2 weeks when she is done with her steroids     2. Eczema, unspecified type  Currently having a hard time off of [address]  Drawing labs prior to restarting with dermatology   - METABOLIC PANEL, COMPREHENSIVE; Future  - CBC WITH AUTOMATED DIFF; Future  - LIPID PANEL; Future  - TSH 3RD GENERATION; Future  - TSH 3RD GENERATION  - LIPID PANEL  - CBC WITH AUTOMATED DIFF  - METABOLIC PANEL, COMPREHENSIVE    3. Gestational hypertension, third trimester  Improved after delivery  Plan to continue to watch  Check BP 2x per week   - METABOLIC PANEL, COMPREHENSIVE; Future  - CBC WITH AUTOMATED DIFF; Future  - LIPID PANEL; Future  - TSH 3RD GENERATION; Future  - TSH 3RD GENERATION  - LIPID PANEL  - CBC WITH AUTOMATED DIFF  - METABOLIC PANEL, COMPREHENSIVE    4. Impaired fasting glucose  Diet controlled   - HEMOGLOBIN A1C WITH EAG;  Future  - HEMOGLOBIN A1C WITH EAG     RTC PRN     Augustine aTylor NP

## 2022-10-22 LAB
ALBUMIN SERPL-MCNC: 3 G/DL (ref 3.5–5)
ALBUMIN/GLOB SERPL: 0.7 {RATIO} (ref 1.1–2.2)
ALP SERPL-CCNC: 98 U/L (ref 45–117)
ALT SERPL-CCNC: 44 U/L (ref 12–78)
ANION GAP SERPL CALC-SCNC: 5 MMOL/L (ref 5–15)
AST SERPL-CCNC: 22 U/L (ref 15–37)
BASOPHILS # BLD: 0 K/UL (ref 0–0.1)
BASOPHILS NFR BLD: 0 % (ref 0–1)
BILIRUB SERPL-MCNC: 0.3 MG/DL (ref 0.2–1)
BUN SERPL-MCNC: 13 MG/DL (ref 6–20)
BUN/CREAT SERPL: 14 (ref 12–20)
CALCIUM SERPL-MCNC: 8.8 MG/DL (ref 8.5–10.1)
CHLORIDE SERPL-SCNC: 106 MMOL/L (ref 97–108)
CHOLEST SERPL-MCNC: 150 MG/DL
CO2 SERPL-SCNC: 29 MMOL/L (ref 21–32)
CREAT SERPL-MCNC: 0.95 MG/DL (ref 0.55–1.02)
DIFFERENTIAL METHOD BLD: ABNORMAL
EOSINOPHIL # BLD: 0.3 K/UL (ref 0–0.4)
EOSINOPHIL NFR BLD: 3 % (ref 0–7)
ERYTHROCYTE [DISTWIDTH] IN BLOOD BY AUTOMATED COUNT: 15 % (ref 11.5–14.5)
EST. AVERAGE GLUCOSE BLD GHB EST-MCNC: 128 MG/DL
GLOBULIN SER CALC-MCNC: 4.2 G/DL (ref 2–4)
GLUCOSE SERPL-MCNC: 85 MG/DL (ref 65–100)
HBA1C MFR BLD: 6.1 % (ref 4–5.6)
HCT VFR BLD AUTO: 37.4 % (ref 35–47)
HDLC SERPL-MCNC: 43 MG/DL
HDLC SERPL: 3.5 {RATIO} (ref 0–5)
HGB BLD-MCNC: 12.1 G/DL (ref 11.5–16)
IMM GRANULOCYTES # BLD AUTO: 0.1 K/UL (ref 0–0.04)
IMM GRANULOCYTES NFR BLD AUTO: 1 % (ref 0–0.5)
LDLC SERPL CALC-MCNC: 93.6 MG/DL (ref 0–100)
LYMPHOCYTES # BLD: 3.9 K/UL (ref 0.8–3.5)
LYMPHOCYTES NFR BLD: 33 % (ref 12–49)
MCH RBC QN AUTO: 30.7 PG (ref 26–34)
MCHC RBC AUTO-ENTMCNC: 32.4 G/DL (ref 30–36.5)
MCV RBC AUTO: 94.9 FL (ref 80–99)
MONOCYTES # BLD: 1.4 K/UL (ref 0–1)
MONOCYTES NFR BLD: 12 % (ref 5–13)
NEUTS SEG # BLD: 5.9 K/UL (ref 1.8–8)
NEUTS SEG NFR BLD: 51 % (ref 32–75)
NRBC # BLD: 0 K/UL (ref 0–0.01)
NRBC BLD-RTO: 0 PER 100 WBC
PLATELET # BLD AUTO: 383 K/UL (ref 150–400)
PMV BLD AUTO: 10.1 FL (ref 8.9–12.9)
POTASSIUM SERPL-SCNC: 4 MMOL/L (ref 3.5–5.1)
PROT SERPL-MCNC: 7.2 G/DL (ref 6.4–8.2)
RBC # BLD AUTO: 3.94 M/UL (ref 3.8–5.2)
SODIUM SERPL-SCNC: 140 MMOL/L (ref 136–145)
TRIGL SERPL-MCNC: 67 MG/DL (ref ?–150)
TSH SERPL DL<=0.05 MIU/L-ACNC: 1.77 UIU/ML (ref 0.36–3.74)
VLDLC SERPL CALC-MCNC: 13.4 MG/DL
WBC # BLD AUTO: 11.7 K/UL (ref 3.6–11)

## 2022-10-24 NOTE — PROGRESS NOTES
A1c up to 6.1%. getting closer to diabetic range. Important to work on weight loss and diet changes. Thyroid level is good. Cholesterol is good. CBC does show continued elevated WBC count like we discussed. I will set you up with hematology in [facility] for work-up. Liver and kidneys are good.

## 2023-02-05 NOTE — PROGRESS NOTES
Chief Complaint   Patient presents with    Fatigue     Patient would like labs done today . Shad Rao feeling very tired and no motivation . . she does has children and a 11 month old  who is still waking at least once in the night          HPI:      Sanjuana Lyman is a 33 y.o. female. Works for [employer]. In the [employer] office. Has 4 children between her 2 sons and 2 stepdaughters. Asthma: Uses an albuterol inhaler intermittently. Eczema: Back on Dupixent. Managed by [facility]. Works well. Depression: Previously on Effexor. Held during pregnancy. Has not restarted, but is doing counseling. New Issues: A1c was up to 6.1% last visit. She was also sent to hematology. Labs with their office have looked good so far. She has been very fatigued. Caffeine does not help. Sleeps \"OK\" at night. Baby gets up about 1x per night. Her  says she snores. She has not had her cycle since having her 11 month old. Allergies   Allergen Reactions    Peanut Hives    Seafood Hives       Current Outpatient Medications   Medication Sig    Dupixent Pen 300 mg/2 mL pnij     Slynd 4 mg (28) tab Take 1 Tablet by mouth daily. PNV No.40-Iron Fum-FA Cmb No.1 27-1 mg tab Take  by mouth. (Patient not taking: Reported on 2023)     No current facility-administered medications for this visit.        Past Medical History:   Diagnosis Date    Asthma        Past Surgical History:   Procedure Laterality Date    HX OTHER SURGICAL      eczema       Social History     Socioeconomic History    Marital status:    Tobacco Use    Smoking status: Never    Smokeless tobacco: Never   Vaping Use    Vaping Use: Never used   Substance and Sexual Activity    Alcohol use: No    Sexual activity: Yes     Partners: Male     Social Determinants of Health     Financial Resource Strain: Low Risk     Difficulty of Paying Living Expenses: Not hard at all   Food Insecurity: No Food Insecurity    Worried About Running Out of Food in the Last Year: Never true    Ran Out of Food in the Last Year: Never true       History reviewed. No pertinent family history. Above history reviewed. ROS:  Denies fever, chills, cough, chest pain, SOB,  nausea, vomiting, or diarrhea. Denies wt loss, wt gain, hemoptysis, hematochezia or melena. Physical Examination:    /70 (BP 1 Location: Left arm, BP Patient Position: Sitting)   Pulse (!) 101   Temp 97.8 °F (36.6 °C) (Temporal)   Resp 18   Ht 5' 7\" (1.702 m)   Wt 294 lb 3.2 oz (133.4 kg)   SpO2 97%   BMI 46.08 kg/m²     General: Alert and Ox3, Fluent speech, overweight   HEENT:  PERRLA, EOM intact, TMs, turbinates, pharynx normal.  No thyromegaly. No cervical adenopathy. Neck:  Supple, no adenopathy, JVD, mass or bruit  Chest:  Clear to Ausculation, without wheezes, rales, rubs or ronchi  Cardiac: RRR  Abdomen:  +BS, soft, nontender without palpable HSM  Extremities:  No cyanosis, clubbing or edema  Neurologic:  Ambulatory without assist, CN 2-12 grossly intact. Moves all extremities. Skin: no rash  Lymphadenopathy: no cervical or supraclavicular nodes    Results for orders placed or performed in visit on 02/06/23   AMB POC URINE PREGNANCY TEST, VISUAL COLOR COMPARISON   Result Value Ref Range    VALID INTERNAL CONTROL POC Yes     HCG urine, Ql. (POC) Negative Negative      EKG: sinus rhythm. ASSESSMENT AND PLAN:     1. Chronic fatigue  Checking for underlying labs  No arrhythmia on EKG  - TSH 3RD GENERATION; Future  - T4, FREE; Future  - AMB POC EKG ROUTINE W/ 12 LEADS, INTER & REP  - AMB POC URINE PREGNANCY TEST, VISUAL COLOR COMPARISON  - T4, FREE  - TSH 3RD GENERATION    2. Vitamin D deficiency  Checking labs   - VITAMIN D, 25 HYDROXY; Future  - VITAMIN D, 25 HYDROXY    3. Vitamin B12 deficiency  Checking labs  - VITAMIN B12; Future  - VITAMIN B12    4. Prediabetes  Diet controlled  Will need medication if over 6.4%  - HEMOGLOBIN A1C WITH EAG;  Future  - HEMOGLOBIN A1C WITH EAG    5. Tachycardia  No arrhythmia on EKG   - AMB POC EKG ROUTINE W/ 12 LEADS, INTER & REP    6.  Snoring  Will need sleep study if labs normal      RTC PRN    Yvonne Alvarez NP

## 2023-02-06 ENCOUNTER — OFFICE VISIT (OUTPATIENT)
Dept: FAMILY MEDICINE CLINIC | Age: 33
End: 2023-02-06
Payer: COMMERCIAL

## 2023-02-06 VITALS
TEMPERATURE: 97.8 F | DIASTOLIC BLOOD PRESSURE: 70 MMHG | RESPIRATION RATE: 18 BRPM | WEIGHT: 293 LBS | HEART RATE: 101 BPM | SYSTOLIC BLOOD PRESSURE: 122 MMHG | HEIGHT: 67 IN | BODY MASS INDEX: 45.99 KG/M2 | OXYGEN SATURATION: 97 %

## 2023-02-06 DIAGNOSIS — R06.83 SNORING: ICD-10-CM

## 2023-02-06 DIAGNOSIS — R73.03 PREDIABETES: ICD-10-CM

## 2023-02-06 DIAGNOSIS — E55.9 VITAMIN D DEFICIENCY: ICD-10-CM

## 2023-02-06 DIAGNOSIS — R53.82 CHRONIC FATIGUE: Primary | ICD-10-CM

## 2023-02-06 DIAGNOSIS — R00.0 TACHYCARDIA: ICD-10-CM

## 2023-02-06 DIAGNOSIS — E53.8 VITAMIN B12 DEFICIENCY: ICD-10-CM

## 2023-02-06 LAB
HCG URINE, QL. (POC): NEGATIVE
VALID INTERNAL CONTROL?: YES

## 2023-02-06 PROCEDURE — 81025 URINE PREGNANCY TEST: CPT | Performed by: NURSE PRACTITIONER

## 2023-02-06 PROCEDURE — 99214 OFFICE O/P EST MOD 30 MIN: CPT | Performed by: NURSE PRACTITIONER

## 2023-02-06 PROCEDURE — 93000 ELECTROCARDIOGRAM COMPLETE: CPT | Performed by: NURSE PRACTITIONER

## 2023-02-06 RX ORDER — DUPILUMAB 300 MG/2ML
INJECTION, SOLUTION SUBCUTANEOUS
COMMUNITY
Start: 2023-01-23

## 2023-02-06 NOTE — PROGRESS NOTES
Rossi Trammell is a 33 y.o. female presenting for/with:    Chief Complaint   Patient presents with    Fatigue     Patient would like labs done today . Nikki Ruffing feeling very tired and no motivation . . she does has children and a 11 month old  who is still waking at least once in the night        Visit Vitals  /70 (BP 1 Location: Left arm, BP Patient Position: Sitting)   Pulse (!) 101   Temp 97.8 °F (36.6 °C) (Temporal)   Resp 18   Ht 5' 7\" (1.702 m)   Wt 294 lb 3.2 oz (133.4 kg)   SpO2 97%   BMI 46.08 kg/m²     Pain Scale: 0 - No pain/10  Pain Location:     1. \"Have you been to the ER, urgent care clinic since your last visit? Hospitalized since your last visit? \" No    2. \"Have you seen or consulted any other health care providers outside of the [address] [place] [address] since your last visit? \" No     3. For patients aged 39-70: Has the patient had a colonoscopy / FIT/ Cologuard? NA - based on age      If the patient is female:    4. For patients aged 41-77: Has the patient had a mammogram within the past 2 years? NA - based on age or sex      11. For patients aged 21-65: Has the patient had a pap smear? No          Patient    Learning Assessment 2022   PRIMARY LEARNER Patient   PRIMARY LANGUAGE ENGLISH   LEARNER PREFERENCE PRIMARY READING   ANSWERED BY pt   RELATIONSHIP SELF     Fall Risk Assessment, last 12 mths 2022   Able to walk? Yes   Fall in past 12 months? 0   Do you feel unsteady?  0   Are you worried about falling 0       3 most recent PHQ Screens 2023   Little interest or pleasure in doing things Not at all   Feeling down, depressed, irritable, or hopeless Not at all   Total Score PHQ 2 0   Trouble falling or staying asleep, or sleeping too much -   Feeling tired or having little energy -   Poor appetite, weight loss, or overeating -   Feeling bad about yourself - or that you are a failure or have let yourself or your family down -   Trouble concentrating on things such as school, work, reading, or watching TV -   Moving or speaking so slowly that other people could have noticed; or the opposite being so fidgety that others notice -   Thoughts of being better off dead, or hurting yourself in some way -   PHQ 9 Score -   How difficult have these problems made it for you to do your work, take care of your home and get along with others -     Abuse Screening Questionnaire 10/21/2022   Do you ever feel afraid of your partner? N   Are you in a relationship with someone who physically or mentally threatens you? N   Is it safe for you to go home?  Y       ADL Assessment 10/21/2022   Feeding yourself No Help Needed   Getting from bed to chair No Help Needed   Getting dressed No Help Needed   Bathing or showering No Help Needed   Walk across the room (includes cane/walker) No Help Needed   Using the telphone No Help Needed   Taking your medications No Help Needed   Preparing meals No Help Needed   Managing money (expenses/bills) No Help Needed   Moderately strenuous housework (laundry) No Help Needed   Shopping for personal items (toiletries/medicines) No Help Needed   Shopping for groceries No Help Needed   Driving No Help Needed   Climbing a flight of stairs No Help Needed   Getting to places beyond walking distances No Help Needed      Advance Care Planning 8/16/2022   Patient's Healthcare Decision Maker is: -   Primary Decision Maker Name -   Primary Decision Maker Phone Number -   Primary Decision Maker Relationship to Patient -   Confirm Advance Directive None   Patient Would Like to Complete Advance Directive No

## 2023-02-06 NOTE — PROGRESS NOTES
Results for orders placed or performed in visit on 02/06/23   AMB POC URINE PREGNANCY TEST, VISUAL COLOR COMPARISON   Result Value Ref Range    VALID INTERNAL CONTROL POC Yes     HCG urine, Ql. (POC) Negative Negative

## 2023-02-07 LAB
25(OH)D3 SERPL-MCNC: 27.5 NG/ML (ref 30–100)
EST. AVERAGE GLUCOSE BLD GHB EST-MCNC: 128 MG/DL
HBA1C MFR BLD: 6.1 % (ref 4–5.6)
T4 FREE SERPL-MCNC: 1 NG/DL (ref 0.8–1.5)
TSH SERPL DL<=0.05 MIU/L-ACNC: 1.59 UIU/ML (ref 0.36–3.74)
VIT B12 SERPL-MCNC: 840 PG/ML (ref 193–986)

## 2023-02-07 NOTE — PROGRESS NOTES
A1c is still in prediabetic range. Keep working hard on diet changes and exercise. Thyroid levels are fine. B12 is fine. Vitamin D is low. Needs at least 2,000 iu of vitamin D daily.

## 2023-04-18 ENCOUNTER — OFFICE VISIT (OUTPATIENT)
Dept: FAMILY MEDICINE CLINIC | Age: 33
End: 2023-04-18
Payer: COMMERCIAL

## 2023-04-18 VITALS — TEMPERATURE: 99.1 F | RESPIRATION RATE: 18 BRPM | HEART RATE: 100 BPM | OXYGEN SATURATION: 92 %

## 2023-04-18 DIAGNOSIS — J02.9 SORE THROAT: Primary | ICD-10-CM

## 2023-04-18 DIAGNOSIS — J03.90 TONSILLITIS: ICD-10-CM

## 2023-04-18 LAB
S PYO AG THROAT QL: NEGATIVE
VALID INTERNAL CONTROL?: YES

## 2023-04-18 PROCEDURE — 87880 STREP A ASSAY W/OPTIC: CPT | Performed by: NURSE PRACTITIONER

## 2023-04-18 PROCEDURE — 99213 OFFICE O/P EST LOW 20 MIN: CPT | Performed by: NURSE PRACTITIONER

## 2023-04-18 RX ORDER — AMOXICILLIN 500 MG/1
500 CAPSULE ORAL 2 TIMES DAILY
Qty: 20 CAPSULE | Refills: 0 | Status: SHIPPED | OUTPATIENT
Start: 2023-04-18 | End: 2023-04-28

## 2023-04-18 NOTE — PROGRESS NOTES
Chief Complaint   Patient presents with    Sore Throat     Sore throat since yesterday . Derik Thomass using allegra and tylenol . . denies any other symptoms at this time          HPI:      Jessi Sanderson is a 33 y.o. female. Works for [employer]. In the [place] office. Has 4 children between her 2 sons and 2 stepdaughters. Asthma: Uses an albuterol inhaler intermittently. Eczema: Back on Dupixent. Managed by [facility]. Works well. Depression: Previously on Effexor. Held during pregnancy. Has not restarted, but is doing counseling. Prediabetes: Diet controlled. Chronic issues with her weight. Tried Contrave in the past without success. Reports watching her diet and exercising. Waiting on New prescription of Ozempic. Lab Results   Component Value Date/Time    Hemoglobin A1c 6.1 (H) 02/06/2023 07:56 AM     New Issues:  She has a severe sore throat that has been present for a few days. Small children at home. Mild fevers. No other symptoms. Taking Allegra and Tylenol at home. Allergies   Allergen Reactions    Peanut Hives    Seafood Hives       Current Outpatient Medications   Medication Sig    semaglutide (Ozempic) 0.25 mg or 0.5 mg (2 mg/3 mL) pnij 0.25 mg by SubCUTAneous route every seven (7) days. Indications: Prediabetes    Dupixent Pen 300 mg/2 mL pnij     Slynd 4 mg (28) tab Take 1 Tablet by mouth daily. PNV No.40-Iron Fum-FA Cmb No.1 27-1 mg tab Take  by mouth. No current facility-administered medications for this visit.        Past Medical History:   Diagnosis Date    Asthma        Past Surgical History:   Procedure Laterality Date    HX OTHER SURGICAL      eczema       Social History     Socioeconomic History    Marital status:    Tobacco Use    Smoking status: Never    Smokeless tobacco: Never   Vaping Use    Vaping Use: Never used   Substance and Sexual Activity    Alcohol use: No    Sexual activity: Yes     Partners: Male     Social Determinants of Health Financial Resource Strain: Low Risk     Difficulty of Paying Living Expenses: Not hard at all   Food Insecurity: No Food Insecurity    Worried About Running Out of Food in the Last Year: Never true    Ran Out of Food in the Last Year: Never true       History reviewed. No pertinent family history. Above history reviewed. ROS:  Denies fever, chills, cough, chest pain, SOB,  nausea, vomiting, or diarrhea. Denies wt loss, wt gain, hemoptysis, hematochezia or melena. Physical Examination:    Pulse 100   Temp 99.1 °F (37.3 °C) (Temporal)   Resp 18   SpO2 92%     General: Alert and Ox3, Fluent speech  HEENT:  PERRLA, EOM intact, TMs, turbinates normal, 3+ tonsils with erythema and white plaques. No thyromegaly. No cervical adenopathy. Neck:  Supple, no adenopathy, JVD, mass or bruit  Chest:  Clear to Ausculation, without wheezes, rales, rubs or ronchi  Cardiac: RRR  Extremities:  No cyanosis, clubbing or edema  Neurologic:  Ambulatory without assist, CN 2-12 grossly intact. Moves all extremities. Skin: no rash  Lymphadenopathy: no cervical or supraclavicular nodes    Results for orders placed or performed in visit on 04/18/23   AMB POC RAPID STREP A   Result Value Ref Range    VALID INTERNAL CONTROL POC Yes     Group A Strep Ag Negative Negative      ASSESSMENT AND PLAN:     1. Sore throat  Negative strep  - AMB POC RAPID STREP A  - amoxicillin (AMOXIL) 500 mg capsule; Take 1 Capsule by mouth two (2) times a day for 10 days. Dispense: 20 Capsule; Refill: 0    2. Tonsillitis  Treating based on patient presentation and complaints   - amoxicillin (AMOXIL) 500 mg capsule; Take 1 Capsule by mouth two (2) times a day for 10 days. Dispense: 20 Capsule;  Refill: 0     RTC PRN    Aleena Higgins NP

## 2023-04-18 NOTE — PROGRESS NOTES
Zuhair Velazquez is a 33 y.o. female presenting for/with:    Chief Complaint   Patient presents with    Sore Throat     Sore throat since yesterday . Yris Hammersmith using allegra and tylenol . . denies any other symptoms at this time        Visit Vitals  Pulse 100   Temp 99.1 °F (37.3 °C) (Temporal)   Resp 18   SpO2 92%     Pain Scale: 0 - No pain/10  Pain Location:     1. \"Have you been to the ER, urgent care clinic since your last visit? Hospitalized since your last visit? \" No    2. \"Have you seen or consulted any other health care providers outside of the [address] [place] [address] since your last visit? \" No     3. For patients aged 39-70: Has the patient had a colonoscopy / FIT/ Cologuard? NA - based on age      If the patient is female:    4. For patients aged 41-77: Has the patient had a mammogram within the past 2 years? NA- based on age      11. For patients aged 21-65: Has the patient had a pap smear? Yes - Care Gap present. Most recent result on file          Patient    Learning Assessment 5/13/2022   PRIMARY LEARNER Patient   PRIMARY LANGUAGE ENGLISH   LEARNER PREFERENCE PRIMARY READING   ANSWERED BY pt   RELATIONSHIP SELF     Fall Risk Assessment, last 12 mths 5/13/2022   Able to walk? Yes   Fall in past 12 months? 0   Do you feel unsteady?  0   Are you worried about falling 0       3 most recent PHQ Screens 4/10/2023   Little interest or pleasure in doing things Not at all   Feeling down, depressed, irritable, or hopeless Not at all   Total Score PHQ 2 0   Trouble falling or staying asleep, or sleeping too much -   Feeling tired or having little energy -   Poor appetite, weight loss, or overeating -   Feeling bad about yourself - or that you are a failure or have let yourself or your family down -   Trouble concentrating on things such as school, work, reading, or watching TV -   Moving or speaking so slowly that other people could have noticed; or the opposite being so fidgety that others notice -   Thoughts of being better off dead, or hurting yourself in some way -   PHQ 9 Score -   How difficult have these problems made it for you to do your work, take care of your home and get along with others -     Abuse Screening Questionnaire 10/21/2022   Do you ever feel afraid of your partner? N   Are you in a relationship with someone who physically or mentally threatens you? N   Is it safe for you to go home?  Y       ADL Assessment 10/21/2022   Feeding yourself No Help Needed   Getting from bed to chair No Help Needed   Getting dressed No Help Needed   Bathing or showering No Help Needed   Walk across the room (includes cane/walker) No Help Needed   Using the telphone No Help Needed   Taking your medications No Help Needed   Preparing meals No Help Needed   Managing money (expenses/bills) No Help Needed   Moderately strenuous housework (laundry) No Help Needed   Shopping for personal items (toiletries/medicines) No Help Needed   Shopping for groceries No Help Needed   Driving No Help Needed   Climbing a flight of stairs No Help Needed   Getting to places beyond walking distances No Help Needed      Advance Care Planning 8/16/2022   Patient's Healthcare Decision Maker is: -   Primary Decision Maker Name -   Primary Decision Maker Phone Number -   Primary Decision Maker Relationship to Patient -   Confirm Advance Directive None   Patient Would Like to Complete Advance Directive No      Results for orders placed or performed in visit on 04/18/23   AMB POC RAPID STREP A   Result Value Ref Range    VALID INTERNAL CONTROL POC Yes     Group A Strep Ag Negative Negative

## 2023-06-06 RX ORDER — SEMAGLUTIDE 0.68 MG/ML
INJECTION, SOLUTION SUBCUTANEOUS
COMMUNITY
Start: 2023-05-05 | End: 2023-06-09 | Stop reason: SDUPTHER

## 2023-06-06 NOTE — PROGRESS NOTES
Chief Complaint   Patient presents with    Other     Pt c/o of R knee pain since April     Medication Check     Follow up          HPI:      Blanca Sparks is a 33 y.o. female. Works for [employer]. In the [employer] office. Has 4 children between her 2 sons and 2 stepdaughters. Asthma: Uses an albuterol inhaler intermittently. Eczema: Back on Dupixent. Managed by [facility]. Works well. Depression: Previously on Effexor. Held during pregnancy. Has not restarted, but is doing counseling. Prediabetes: Diet controlled. Chronic issues with her weight. Tried Contrave in the past without success. Reports watching her diet and exercising. Currently on Ozempic. Hemoglobin A1C   Date Value Ref Range Status   02/06/2023 6.1 (H) 4.0 - 5.6 % Final     Comment:     NEW METHOD  PLEASE NOTE NEW REFERENCE RANGE  (NOTE)  HbA1C Interpretive Ranges  <5.7              Normal  5.7 - 6.4         Consider Prediabetes  >6.5              Consider Diabetes        New Issues:  She is down 10 lbs. She has cut out potatoes and bread in the morning. She has been on the 0.5 dose x 1 week. She had a fall in April and hurt her right knee. This has been a slow recovery. Weak and \"catches\". Allergies   Allergen Reactions    Peanut-Containing Drug Products Anaphylaxis, Rash, Itching and Other (See Comments)     Allergic to all nuts  Allergic to all nuts      Shellfish-Derived Products Hives, Anaphylaxis, Rash and Itching    Peanut (Diagnostic) Hives       Current Outpatient Medications   Medication Sig Dispense Refill    albuterol sulfate HFA (PROVENTIL;VENTOLIN;PROAIR) 108 (90 Base) MCG/ACT inhaler Inhale 2 puffs into the lungs every 4 hours as needed      OZEMPIC, 0.25 OR 0.5 MG/DOSE, 2 MG/3ML SOPN 0.25 MG BY SUBCUTANEOUS ROUTE EVERY SEVEN (7) DAYS.  INDICATIONS: PREDIABETES      Drospirenone (SLYND) 4 MG TABS Take 1 tablet by mouth daily      dupilumab (DUPIXENT) 300 MG/2ML SOPN injection ceived the

## 2023-06-09 ENCOUNTER — HOSPITAL ENCOUNTER (OUTPATIENT)
Facility: HOSPITAL | Age: 33
Discharge: HOME | End: 2023-06-09
Payer: COMMERCIAL

## 2023-06-09 ENCOUNTER — OFFICE VISIT (OUTPATIENT)
Age: 33
End: 2023-06-09
Payer: COMMERCIAL

## 2023-06-09 VITALS
SYSTOLIC BLOOD PRESSURE: 120 MMHG | WEIGHT: 293 LBS | OXYGEN SATURATION: 97 % | DIASTOLIC BLOOD PRESSURE: 82 MMHG | HEART RATE: 98 BPM | TEMPERATURE: 97.8 F | HEIGHT: 67 IN | BODY MASS INDEX: 45.99 KG/M2 | RESPIRATION RATE: 20 BRPM

## 2023-06-09 DIAGNOSIS — M25.561 CHRONIC PAIN OF RIGHT KNEE: ICD-10-CM

## 2023-06-09 DIAGNOSIS — E66.01 MORBID (SEVERE) OBESITY DUE TO EXCESS CALORIES (HCC): Primary | ICD-10-CM

## 2023-06-09 DIAGNOSIS — G89.29 CHRONIC PAIN OF RIGHT KNEE: ICD-10-CM

## 2023-06-09 PROCEDURE — 73564 X-RAY EXAM KNEE 4 OR MORE: CPT

## 2023-06-09 PROCEDURE — 99213 OFFICE O/P EST LOW 20 MIN: CPT | Performed by: NURSE PRACTITIONER

## 2023-06-09 RX ORDER — ALBUTEROL SULFATE 90 UG/1
2 AEROSOL, METERED RESPIRATORY (INHALATION) EVERY 4 HOURS PRN
COMMUNITY
Start: 2015-01-12

## 2023-06-09 RX ORDER — SEMAGLUTIDE 0.68 MG/ML
INJECTION, SOLUTION SUBCUTANEOUS
Qty: 3 ML | Refills: 1 | Status: SHIPPED | OUTPATIENT
Start: 2023-06-09

## 2023-06-09 SDOH — ECONOMIC STABILITY: INCOME INSECURITY: HOW HARD IS IT FOR YOU TO PAY FOR THE VERY BASICS LIKE FOOD, HOUSING, MEDICAL CARE, AND HEATING?: NOT HARD AT ALL

## 2023-06-09 SDOH — ECONOMIC STABILITY: FOOD INSECURITY: WITHIN THE PAST 12 MONTHS, YOU WORRIED THAT YOUR FOOD WOULD RUN OUT BEFORE YOU GOT MONEY TO BUY MORE.: NEVER TRUE

## 2023-06-09 SDOH — ECONOMIC STABILITY: FOOD INSECURITY: WITHIN THE PAST 12 MONTHS, THE FOOD YOU BOUGHT JUST DIDN'T LAST AND YOU DIDN'T HAVE MONEY TO GET MORE.: NEVER TRUE

## 2023-06-09 SDOH — ECONOMIC STABILITY: HOUSING INSECURITY
IN THE LAST 12 MONTHS, WAS THERE A TIME WHEN YOU DID NOT HAVE A STEADY PLACE TO SLEEP OR SLEPT IN A SHELTER (INCLUDING NOW)?: NO

## 2023-06-09 ASSESSMENT — PATIENT HEALTH QUESTIONNAIRE - PHQ9
SUM OF ALL RESPONSES TO PHQ QUESTIONS 1-9: 0
SUM OF ALL RESPONSES TO PHQ QUESTIONS 1-9: 0
9. THOUGHTS THAT YOU WOULD BE BETTER OFF DEAD, OR OF HURTING YOURSELF: 0
5. POOR APPETITE OR OVEREATING: 0
10. IF YOU CHECKED OFF ANY PROBLEMS, HOW DIFFICULT HAVE THESE PROBLEMS MADE IT FOR YOU TO DO YOUR WORK, TAKE CARE OF THINGS AT HOME, OR GET ALONG WITH OTHER PEOPLE: 0
SUM OF ALL RESPONSES TO PHQ QUESTIONS 1-9: 0
7. TROUBLE CONCENTRATING ON THINGS, SUCH AS READING THE NEWSPAPER OR WATCHING TELEVISION: 0
SUM OF ALL RESPONSES TO PHQ QUESTIONS 1-9: 0
SUM OF ALL RESPONSES TO PHQ9 QUESTIONS 1 & 2: 0
6. FEELING BAD ABOUT YOURSELF - OR THAT YOU ARE A FAILURE OR HAVE LET YOURSELF OR YOUR FAMILY DOWN: 0
SUM OF ALL RESPONSES TO PHQ QUESTIONS 1-9: 0
1. LITTLE INTEREST OR PLEASURE IN DOING THINGS: 0
2. FEELING DOWN, DEPRESSED OR HOPELESS: 0
3. TROUBLE FALLING OR STAYING ASLEEP: 0
4. FEELING TIRED OR HAVING LITTLE ENERGY: 0
8. MOVING OR SPEAKING SO SLOWLY THAT OTHER PEOPLE COULD HAVE NOTICED. OR THE OPPOSITE, BEING SO FIGETY OR RESTLESS THAT YOU HAVE BEEN MOVING AROUND A LOT MORE THAN USUAL: 0

## 2023-06-09 NOTE — PROGRESS NOTES
Faraz Gomez is a 33 y.o. female presenting for/with:    Chief Complaint   Patient presents with    Other     Pt c/o of R knee pain since April     Medication Check     Follow up        Vitals:    06/09/23 1053   BP: 120/82   Site: Left Upper Arm   Position: Sitting   Cuff Size: Large Adult   Pulse: 98   Resp: 20   Temp: 97.8 °F (36.6 °C)   TempSrc: Temporal   SpO2: 97%   Weight: 293 lb 6.4 oz (133.1 kg)   Height: 5' 7\" (1.702 m)       Pain Scale: 3/10  Pain Location: Knee    1. \"Have you been to the ER, urgent care clinic since your last visit? Hospitalized since your last visit? \" No    2. \"Have you seen or consulted any other health care providers outside of the [address] [place] [address] since your last visit? \" No     3. For patients aged 39-70: Has the patient had a colonoscopy / FIT/ Cologuard? No     If the patient is female:    4. For patients aged 41-77: Has the patient had a mammogram within the past 2 years? No    5. For patients aged 21-65: Has the patient had a pap smear? Yes - Care Gap present. Rooming MA/LPN to request most recent results      PHQ-9  6/9/2023   Little interest or pleasure in doing things -   Little interest or pleasure in doing things -   Feeling down, depressed, or hopeless -   Trouble falling or staying asleep, or sleeping too much 0   Feeling tired or having little energy 0   Poor appetite or overeating 0   Feeling bad about yourself - or that you are a failure or have let yourself or your family down 0   Trouble concentrating on things, such as reading the newspaper or watching television 0   Moving or speaking so slowly that other people could have noticed.  Or the opposite - being so fidgety or restless that you have been moving around a lot more than usual 0   Thoughts that you would be better off dead, or of hurting yourself in some way 0   PHQ-2 Score -   Total Score PHQ 2 -   PHQ-9 Total Score 0   If you checked off any problems, how difficult have these problems made it

## 2023-07-07 ENCOUNTER — PATIENT MESSAGE (OUTPATIENT)
Age: 33
End: 2023-07-07

## 2023-07-07 DIAGNOSIS — E66.01 OBESITY, MORBID (HCC): Primary | ICD-10-CM

## 2023-07-31 NOTE — PROGRESS NOTES
Chief Complaint   Patient presents with    Weight Management     Patient states she is doing well on Ozempic . .. denies side effects . . she is down about 15lbs since June     Headache     C/o frequent headaches that were occurring before she started Ozempic and have continued          HPI:      Domonique Matute is a 33 y.o. female. Works for [employer]. In the [employer] office. Has 4 children between her 2 sons and 2 stepdaughters. Asthma: Uses an albuterol inhaler intermittently. Eczema: Back on Dupixent. Managed by [facility]. Works well. Depression: Previously on Effexor. Held during pregnancy. Has not restarted, but is doing counseling. Prediabetes: Diet controlled. Chronic issues with her weight. Tried Contrave in the past without success. Reports watching her diet and exercising. Currently on Ozempic. Starting weight was 303 lb. Hemoglobin A1C   Date Value Ref Range Status   02/06/2023 6.1 (H) 4.0 - 5.6 % Final     Comment:     NEW METHOD  PLEASE NOTE NEW REFERENCE RANGE  (NOTE)  HbA1C Interpretive Ranges  <5.7              Normal  5.7 - 6.4         Consider Prediabetes  >6.5              Consider Diabetes       New Issues: She was sent to PT for her knee last visit. She is down 15 lbs since June.       Allergies   Allergen Reactions    Peanut-Containing Drug Products Anaphylaxis, Rash, Itching and Other (See Comments)     Allergic to all nuts  Allergic to all nuts      Shellfish-Derived Products Hives, Anaphylaxis, Rash and Itching    Peanut (Diagnostic) Hives       Current Outpatient Medications   Medication Sig Dispense Refill    Semaglutide, 1 MG/DOSE, 4 MG/3ML SOPN Inject 1 mg into the skin every 7 days 3 mL 1    albuterol sulfate HFA (PROVENTIL;VENTOLIN;PROAIR) 108 (90 Base) MCG/ACT inhaler Inhale 2 puffs into the lungs every 4 hours as needed      Drospirenone (SLYND) 4 MG TABS Take 1 tablet by mouth daily      dupilumab (DUPIXENT) 300 MG/2ML SOPN injection ceived the

## 2023-08-10 ENCOUNTER — TELEPHONE (OUTPATIENT)
Age: 33
End: 2023-08-10

## 2023-08-10 NOTE — TELEPHONE ENCOUNTER
Pt called wanting to know if she could be tested for strep or covid, pt states she has congestion, sore throat, and sinus pressure. Call back # [phone].

## 2023-08-11 ENCOUNTER — OFFICE VISIT (OUTPATIENT)
Age: 33
End: 2023-08-11
Payer: COMMERCIAL

## 2023-08-11 VITALS — HEART RATE: 93 BPM | TEMPERATURE: 97.6 F | OXYGEN SATURATION: 97 % | RESPIRATION RATE: 18 BRPM

## 2023-08-11 DIAGNOSIS — R09.81 SINUS CONGESTION: Primary | ICD-10-CM

## 2023-08-11 DIAGNOSIS — J02.9 SORE THROAT: ICD-10-CM

## 2023-08-11 LAB
EXP DATE SOLUTION: NORMAL
EXP DATE SWAB: NORMAL
EXPIRATION DATE: NORMAL
LOT NUMBER POC: NORMAL
LOT NUMBER SOLUTION: NORMAL
LOT NUMBER SWAB: NORMAL
SARS-COV-2 RNA, POC: NEGATIVE
STREP PYOGENES DNA, POC: NEGATIVE
VALID INTERNAL CONTROL, POC: NORMAL

## 2023-08-11 PROCEDURE — 99213 OFFICE O/P EST LOW 20 MIN: CPT | Performed by: INTERNAL MEDICINE

## 2023-08-11 PROCEDURE — 87651 STREP A DNA AMP PROBE: CPT | Performed by: INTERNAL MEDICINE

## 2023-08-11 PROCEDURE — 87635 SARS-COV-2 COVID-19 AMP PRB: CPT | Performed by: INTERNAL MEDICINE

## 2023-08-11 RX ORDER — PREDNISONE 20 MG/1
20 TABLET ORAL DAILY
Qty: 5 TABLET | Refills: 0 | Status: SHIPPED | OUTPATIENT
Start: 2023-08-11 | End: 2023-08-16

## 2023-08-11 RX ORDER — AZITHROMYCIN 250 MG/1
250 TABLET, FILM COATED ORAL SEE ADMIN INSTRUCTIONS
Qty: 6 TABLET | Refills: 0 | Status: SHIPPED | OUTPATIENT
Start: 2023-08-11 | End: 2023-08-16

## 2023-08-11 NOTE — PROGRESS NOTES
1. Sinus congestion  COVID and strep negative. Trial of prednisone and azithromycin and if symptoms do not improve within 7 days return for further evaluation  Red flag symptoms reviewed with patient. - AMB POC COVID-19 COV  - AMB POC STREP GO A DIRECT, DNA PROBE  - predniSONE (DELTASONE) 20 MG tablet; Take 1 tablet by mouth daily for 5 days  Dispense: 5 tablet; Refill: 0  - azithromycin (ZITHROMAX) 250 MG tablet; Take 1 tablet by mouth See Admin Instructions for 5 days 500mg on day 1 followed by 250mg on days 2 - 5  Dispense: 6 tablet; Refill: 0    2. Sore throat  - AMB POC COVID-19 COV  - AMB POC STREP GO A DIRECT, DNA PROBE         Chief Complaint   Patient presents with    Nasal Congestion     C/O nasal congestion, sore throat, and a nonproductive dry cough. Denies fever/chills. S/S started Tuesday. Taking OTC Tylenol cold and flu. X1 Neg home COVID test (reports being )         Orders Placed This Encounter   Procedures    AMB POC COVID-19 COV     Order Specific Question:   Previously tested for COVID-19? Answer: Yes    AMB POC Stormy Oliva, DNA PROBE       Yoshi Taylor MD, FACP      HPI:        Kristin Huntley is a 33 y.o. female who arrives for congestion. Ill for about 3 days. Using over-the-counter decongestants. Did a sinus rinse last night.     COVID test and strep are negative today        Allergies   Allergen Reactions    Peanut-Containing Drug Products Anaphylaxis, Rash, Itching and Other (See Comments)     Allergic to all nuts  Allergic to all nuts      Shellfish-Derived Products Hives, Anaphylaxis, Rash and Itching    Peanut (Diagnostic) Hives       Outpatient Encounter Medications as of 2023   Medication Sig Dispense Refill    predniSONE (DELTASONE) 20 MG tablet Take 1 tablet by mouth daily for 5 days 5 tablet 0    azithromycin (ZITHROMAX) 250 MG tablet Take 1 tablet by mouth See Admin Instructions for 5 days 500mg on day 1 followed by 250mg on days 2 - 5 6 tablet 0

## 2023-08-28 ENCOUNTER — OFFICE VISIT (OUTPATIENT)
Age: 33
End: 2023-08-28
Payer: COMMERCIAL

## 2023-08-28 VITALS
HEART RATE: 65 BPM | BODY MASS INDEX: 43.66 KG/M2 | SYSTOLIC BLOOD PRESSURE: 118 MMHG | WEIGHT: 278.2 LBS | OXYGEN SATURATION: 99 % | HEIGHT: 67 IN | TEMPERATURE: 98.4 F | RESPIRATION RATE: 18 BRPM | DIASTOLIC BLOOD PRESSURE: 90 MMHG

## 2023-08-28 DIAGNOSIS — E66.01 OBESITY, MORBID (HCC): Primary | ICD-10-CM

## 2023-08-28 PROBLEM — F41.9 ANXIETY: Status: ACTIVE | Noted: 2023-08-28

## 2023-08-28 PROBLEM — F33.42 RECURRENT MAJOR DEPRESSIVE DISORDER, IN FULL REMISSION (HCC): Status: ACTIVE | Noted: 2018-01-26

## 2023-08-28 PROBLEM — R03.0 ELEVATED BLOOD-PRESSURE READING WITHOUT DIAGNOSIS OF HYPERTENSION: Status: ACTIVE | Noted: 2023-08-28

## 2023-08-28 PROBLEM — F33.42 RECURRENT MAJOR DEPRESSIVE DISORDER, IN FULL REMISSION (HCC): Status: RESOLVED | Noted: 2018-01-26 | Resolved: 2023-08-28

## 2023-08-28 PROCEDURE — 99213 OFFICE O/P EST LOW 20 MIN: CPT | Performed by: NURSE PRACTITIONER

## 2023-08-28 ASSESSMENT — PATIENT HEALTH QUESTIONNAIRE - PHQ9
6. FEELING BAD ABOUT YOURSELF - OR THAT YOU ARE A FAILURE OR HAVE LET YOURSELF OR YOUR FAMILY DOWN: 0
5. POOR APPETITE OR OVEREATING: 0
SUM OF ALL RESPONSES TO PHQ QUESTIONS 1-9: 0
3. TROUBLE FALLING OR STAYING ASLEEP: 0
SUM OF ALL RESPONSES TO PHQ9 QUESTIONS 1 & 2: 0
SUM OF ALL RESPONSES TO PHQ QUESTIONS 1-9: 0
2. FEELING DOWN, DEPRESSED OR HOPELESS: 0
SUM OF ALL RESPONSES TO PHQ QUESTIONS 1-9: 0
SUM OF ALL RESPONSES TO PHQ QUESTIONS 1-9: 0
9. THOUGHTS THAT YOU WOULD BE BETTER OFF DEAD, OR OF HURTING YOURSELF: 0
SUM OF ALL RESPONSES TO PHQ QUESTIONS 1-9: 0
1. LITTLE INTEREST OR PLEASURE IN DOING THINGS: 0
SUM OF ALL RESPONSES TO PHQ QUESTIONS 1-9: 0
8. MOVING OR SPEAKING SO SLOWLY THAT OTHER PEOPLE COULD HAVE NOTICED. OR THE OPPOSITE, BEING SO FIGETY OR RESTLESS THAT YOU HAVE BEEN MOVING AROUND A LOT MORE THAN USUAL: 0
7. TROUBLE CONCENTRATING ON THINGS, SUCH AS READING THE NEWSPAPER OR WATCHING TELEVISION: 0
4. FEELING TIRED OR HAVING LITTLE ENERGY: 0
2. FEELING DOWN, DEPRESSED OR HOPELESS: 0
SUM OF ALL RESPONSES TO PHQ9 QUESTIONS 1 & 2: 0
SUM OF ALL RESPONSES TO PHQ QUESTIONS 1-9: 0
1. LITTLE INTEREST OR PLEASURE IN DOING THINGS: 0
SUM OF ALL RESPONSES TO PHQ QUESTIONS 1-9: 0
10. IF YOU CHECKED OFF ANY PROBLEMS, HOW DIFFICULT HAVE THESE PROBLEMS MADE IT FOR YOU TO DO YOUR WORK, TAKE CARE OF THINGS AT HOME, OR GET ALONG WITH OTHER PEOPLE: 0

## 2023-09-29 ENCOUNTER — PATIENT MESSAGE (OUTPATIENT)
Age: 33
End: 2023-09-29

## 2023-09-29 DIAGNOSIS — E66.01 OBESITY, MORBID (HCC): ICD-10-CM

## 2023-09-29 RX ORDER — SEMAGLUTIDE 1.34 MG/ML
1 INJECTION, SOLUTION SUBCUTANEOUS
Qty: 9 ML | Refills: 1 | Status: SHIPPED | OUTPATIENT
Start: 2023-09-29 | End: 2023-09-29

## 2023-10-10 ENCOUNTER — PATIENT MESSAGE (OUTPATIENT)
Age: 33
End: 2023-10-10

## 2023-10-10 DIAGNOSIS — F41.9 ANXIETY: Primary | ICD-10-CM

## 2023-10-10 RX ORDER — VENLAFAXINE HYDROCHLORIDE 37.5 MG/1
37.5 CAPSULE, EXTENDED RELEASE ORAL DAILY
Qty: 90 CAPSULE | Refills: 3 | Status: SHIPPED | OUTPATIENT
Start: 2023-10-10

## 2023-12-04 NOTE — PROGRESS NOTES
Chief Complaint   Patient presents with    Sore Throat     Started on tues/Wed    Ear Pain         HPI:       is a 28 y.o. female. Works for [employer]. In the [employer] office. Has a 10year old son and a new baby boy. Asthma: Uses an albuterol inhaler intermittently. Eczema: Previously on Dupixent. Managed by [facility]. Was off during pregnancy      Depression: Previously on Effexor. Held during pregnancy. New Issues:  She is here for a sick visit. Her son was diagnosed with strep last week. She is c/o sore throat, right ear pain and some nasal drainage. No fever or chills. Allergies   Allergen Reactions    Peanut Hives    Seafood Hives       Current Outpatient Medications   Medication Sig    azithromycin (ZITHROMAX) 250 mg tablet Take 2 tablets today, then take 1 tablet daily    ibuprofen (MOTRIN) 800 mg tablet Take 1 Tablet by mouth every eight (8) hours as needed for Pain. aspirin 81 mg chewable tablet Take 81 mg by mouth daily. PNV No.40-Iron Fum-FA Cmb No.1 27-1 mg tab Take  by mouth. No current facility-administered medications for this visit. Past Medical History:   Diagnosis Date    Asthma        Past Surgical History:   Procedure Laterality Date    HX OTHER SURGICAL      eczema       Social History     Socioeconomic History    Marital status:    Tobacco Use    Smoking status: Never    Smokeless tobacco: Never   Vaping Use    Vaping Use: Never used   Substance and Sexual Activity    Alcohol use: No    Sexual activity: Yes     Partners: Male       History reviewed. No pertinent family history. Above history reviewed. ROS:  Denies fever, chills, POS right ear pain, POS sore throat, POS cough, denies chest pain, SOB,  nausea, vomiting, or diarrhea. Denies wt loss, wt gain, hemoptysis, hematochezia or melena.     Physical Examination:    Pulse 87   Temp 97.8 °F (36.6 °C) (Temporal)   Resp 20   SpO2 97%     General: Alert and Ox3, Fluent speech  HEENT:  PERRLA, EOM intact, TMs, turbinates, pharynx normal.  No thyromegaly. No cervical adenopathy. Neck:  Supple, no adenopathy, JVD, mass or bruit  Chest:  Clear to Ausculation, without wheezes, rales, rubs or ronchi  Cardiac: RRR  Extremities:  No cyanosis, clubbing or edema  Neurologic:  Ambulatory without assist, CN 2-12 grossly intact. Moves all extremities. Skin: no rash  Lymphadenopathy: no cervical or supraclavicular nodes    Results for orders placed or performed in visit on 09/30/22   AMB POC COVID-19 COV   Result Value Ref Range    SARS-COV-2 RNA POC Negative Negative    LOT NUMBER SWAB [id number]     EXP DATE SWAB 02/28/2025     LOT NUMBER SOLUTION [id number]     EXP DATE SOLUTION 05/02/2023    AMB POC RAPID STREP A   Result Value Ref Range    VALID INTERNAL CONTROL POC Yes     Group A Strep Ag Negative Negative      ASSESSMENT AND PLAN:     1. Sore throat  NEG strep and COVID   - AMB POC COVID-19 COV  - AMB POC RAPID STREP A  - azithromycin (ZITHROMAX) 250 mg tablet; Take 2 tablets today, then take 1 tablet daily  Dispense: 6 Tablet; Refill: 0    2. Upper respiratory tract infection, unspecified type  Reviewed self care measures:  Fluids  Nasal Saline  Humidification + menthol petroleum (Vicks.)  Postural drainage  NSAID of choice PRN  Avoid decongestants, too drying and difficult to clear respiratory secretions. - azithromycin (ZITHROMAX) 250 mg tablet; Take 2 tablets today, then take 1 tablet daily  Dispense: 6 Tablet;  Refill: 0    RTC PRN    Virginia Ponce NP regular

## 2024-01-12 ENCOUNTER — OFFICE VISIT (OUTPATIENT)
Age: 34
End: 2024-01-12
Payer: COMMERCIAL

## 2024-01-12 VITALS — RESPIRATION RATE: 18 BRPM | TEMPERATURE: 98.2 F | HEART RATE: 89 BPM | OXYGEN SATURATION: 97 %

## 2024-01-12 DIAGNOSIS — U07.1 COVID-19: ICD-10-CM

## 2024-01-12 DIAGNOSIS — R05.9 COUGH, UNSPECIFIED TYPE: ICD-10-CM

## 2024-01-12 DIAGNOSIS — J02.9 SORE THROAT: Primary | ICD-10-CM

## 2024-01-12 LAB
EXP DATE SOLUTION: ABNORMAL
EXP DATE SWAB: ABNORMAL
EXPIRATION DATE: ABNORMAL
GROUP A STREP ANTIGEN, POC: NEGATIVE
INFLUENZA A ANTIGEN, POC: NEGATIVE
INFLUENZA B ANTIGEN, POC: NEGATIVE
LOT NUMBER POC: ABNORMAL
LOT NUMBER SOLUTION: ABNORMAL
LOT NUMBER SWAB: ABNORMAL
SARS-COV-2 RNA, POC: POSITIVE
VALID INTERNAL CONTROL, POC: NORMAL
VALID INTERNAL CONTROL, POC: NORMAL

## 2024-01-12 PROCEDURE — 87635 SARS-COV-2 COVID-19 AMP PRB: CPT | Performed by: NURSE PRACTITIONER

## 2024-01-12 PROCEDURE — 87502 INFLUENZA DNA AMP PROBE: CPT | Performed by: NURSE PRACTITIONER

## 2024-01-12 PROCEDURE — 87880 STREP A ASSAY W/OPTIC: CPT | Performed by: NURSE PRACTITIONER

## 2024-01-12 PROCEDURE — 99213 OFFICE O/P EST LOW 20 MIN: CPT | Performed by: NURSE PRACTITIONER

## 2024-01-12 NOTE — PROGRESS NOTES
Chief Complaint   Patient presents with    Cough     Symptoms started on Wed .. Using Tylenol cold/flu .. Denies fever or chills     Pharyngitis    Headache    Generalized Body Aches         HPI:       is a 33 y.o. female.      New Issues:  She presents for a sick visit.  She has had sinus pressure, sore throat, cough and body aches since Wednesday.  Using Tylenol cold and flu at home.  No SOB.  Feeling better today.     Allergies   Allergen Reactions    Peanut-Containing Drug Products Anaphylaxis, Rash, Itching and Other (See Comments)     Allergic to all nuts  Allergic to all nuts      Shellfish-Derived Products Hives, Anaphylaxis, Rash and Itching    Peanut (Diagnostic) Hives       Current Outpatient Medications   Medication Sig Dispense Refill    venlafaxine (EFFEXOR XR) 37.5 MG extended release capsule Take 1 capsule by mouth daily 90 capsule 3    Semaglutide, 2 MG/DOSE, 8 MG/3ML SOPN Inject 2 mg into the skin every 7 days 12 mL 0    albuterol sulfate HFA (PROVENTIL;VENTOLIN;PROAIR) 108 (90 Base) MCG/ACT inhaler Inhale 2 puffs into the lungs every 4 hours as needed      Drospirenone (SLYND) 4 MG TABS Take 1 tablet by mouth daily      dupilumab (DUPIXENT) 300 MG/2ML SOPN injection ceived the following from [facility]: Outside name: Dupixent Pen 300 mg/2 mL pnij       No current facility-administered medications for this visit.        Past Medical History:   Diagnosis Date    Asthma     Headache     High-risk pregnancy 12/23/2015    Entered By: Carol Jerry MDSigned By: Carol Jerry MDStop Reason: Resolved Description: High risk pregnancy code: V23.89    Recurrent major depressive disorder, in full remission (HCC) 1/26/2018    h/o       Past Surgical History:   Procedure Laterality Date    OTHER SURGICAL HISTORY      eczema       Social History     Socioeconomic History    Marital status:      Spouse name: None    Number of children: None    Years of education: None

## 2024-01-12 NOTE — PROGRESS NOTES
Mariusz Mars is a 33 y.o. female presenting for/with:    Chief Complaint   Patient presents with    Cough     Symptoms started on Wed .. Using Tylenol cold/flu .. Denies fever or chills     Pharyngitis    Headache    Generalized Body Aches       Vitals:    01/12/24 1450   Pulse: 89   Resp: 18   Temp: 98.2 °F (36.8 °C)   TempSrc: Temporal   SpO2: 97%       Pain Scale: 0 - No pain/10  Pain Location:     \"Have you been to the ER, urgent care clinic since your last visit?  Hospitalized since your last visit?\"    NO    “Have you seen or consulted any other health care providers outside of [facility] since your last visit?”    NO                 8/28/2023     2:08 PM   PHQ-9    Little interest or pleasure in doing things 0   Feeling down, depressed, or hopeless 0   Trouble falling or staying asleep, or sleeping too much 0   Feeling tired or having little energy 0   Poor appetite or overeating 0   Feeling bad about yourself - or that you are a failure or have let yourself or your family down 0   Trouble concentrating on things, such as reading the newspaper or watching television 0   Moving or speaking so slowly that other people could have noticed. Or the opposite - being so fidgety or restless that you have been moving around a lot more than usual 0   Thoughts that you would be better off dead, or of hurting yourself in some way 0   PHQ-2 Score 0   PHQ-9 Total Score 0   If you checked off any problems, how difficult have these problems made it for you to do your work, take care of things at home, or get along with other people? 0           6/9/2023    10:40 AM 5/13/2022    12:00 AM 6/29/2021    12:00 AM   [facility] AMB LEARNING ASSESSMENT   Primary Learner Patient Patient Patient   Primary Language ENGLISH ENGLISH ENGLISH   Learning Preference DEMONSTRATION READING READING   Answered By patient pt patient   Relationship to Learner SELF SELF SELF            8/19/2022     8:29 AM   Amb Fall Risk Assessment

## 2024-01-24 DIAGNOSIS — E66.01 OBESITY, MORBID (HCC): ICD-10-CM

## 2024-01-24 RX ORDER — SEMAGLUTIDE 2.68 MG/ML
INJECTION, SOLUTION SUBCUTANEOUS
Qty: 3 ML | Refills: 3 | Status: SHIPPED | OUTPATIENT
Start: 2024-01-24

## 2024-02-27 ENCOUNTER — OFFICE VISIT (OUTPATIENT)
Age: 34
End: 2024-02-27
Payer: COMMERCIAL

## 2024-02-27 VITALS — HEART RATE: 102 BPM | RESPIRATION RATE: 18 BRPM | TEMPERATURE: 98.7 F | OXYGEN SATURATION: 98 %

## 2024-02-27 DIAGNOSIS — R73.03 PREDIABETES: ICD-10-CM

## 2024-02-27 DIAGNOSIS — E66.01 MORBID (SEVERE) OBESITY DUE TO EXCESS CALORIES (HCC): ICD-10-CM

## 2024-02-27 DIAGNOSIS — J02.0 ACUTE STREPTOCOCCAL PHARYNGITIS: ICD-10-CM

## 2024-02-27 DIAGNOSIS — J02.9 SORE THROAT: Primary | ICD-10-CM

## 2024-02-27 LAB
GROUP A STREP ANTIGEN, POC: POSITIVE
VALID INTERNAL CONTROL, POC: ABNORMAL

## 2024-02-27 PROCEDURE — 87880 STREP A ASSAY W/OPTIC: CPT | Performed by: NURSE PRACTITIONER

## 2024-02-27 PROCEDURE — 99213 OFFICE O/P EST LOW 20 MIN: CPT | Performed by: NURSE PRACTITIONER

## 2024-02-27 RX ORDER — AMOXICILLIN 500 MG/1
500 CAPSULE ORAL 2 TIMES DAILY
Qty: 20 CAPSULE | Refills: 0 | Status: SHIPPED | OUTPATIENT
Start: 2024-02-27 | End: 2024-03-08

## 2024-02-27 RX ORDER — SEMAGLUTIDE 2.68 MG/ML
INJECTION, SOLUTION SUBCUTANEOUS
Qty: 3 ML | Refills: 3 | Status: SHIPPED | OUTPATIENT
Start: 2024-02-27

## 2024-02-27 NOTE — PROGRESS NOTES
Chief Complaint   Patient presents with    Pharyngitis     States her throat started to hurt on Sunday .. Denies fevers .. Painful to swallow .. Not taking anything otc    Headache         HPI:       is a 33 y.o. female.  Works for [employer].  In the [employer].  Has 4 children between her 2 sons and 2 stepdaughters.      Asthma: Uses an albuterol inhaler intermittently.       Eczema: Back on Dupixent.  Managed by [facility].  Works well.       Depression: Previously on Effexor.  Held during pregnancy. Has not restarted, but is doing counseling.      Prediabetes: Currently on Ozempic.  Starting weight was 303 lb. Chronic issues with her weight.  Tried Contrave in the past without success.  Reports watching her diet and exercising.    Hemoglobin A1C   Date Value Ref Range Status   02/06/2023 6.1 (H) 4.0 - 5.6 % Final     Comment:     NEW METHOD  PLEASE NOTE NEW REFERENCE RANGE  (NOTE)  HbA1C Interpretive Ranges  <5.7              Normal  5.7 - 6.4         Consider Prediabetes  >6.5              Consider Diabetes        New Issues:  She is here for a problem visit.  She reports that she has had a sore throat since Sunday.  Mild headache.  Vomited this morning.  No fever.  She also notes that her insurance is refusing her Ozempic that they were covering 100% for her DM.      Allergies   Allergen Reactions    Peanut-Containing Drug Products Anaphylaxis, Rash, Itching and Other (See Comments)     Allergic to all nuts  Allergic to all nuts      Shellfish-Derived Products Hives, Anaphylaxis, Rash and Itching    Peanut (Diagnostic) Hives       Current Outpatient Medications   Medication Sig Dispense Refill    OZEMPIC, 2 MG/DOSE, 8 MG/3ML SOPN sc injection INJECT 2 MG INTO THE SKIN EVERY 7 DAYS 3 mL 3    venlafaxine (EFFEXOR XR) 37.5 MG extended release capsule Take 1 capsule by mouth daily 90 capsule 3    albuterol sulfate HFA (PROVENTIL;VENTOLIN;PROAIR) 108 (90 Base) MCG/ACT inhaler Inhale 2 puffs

## 2024-02-27 NOTE — PROGRESS NOTES
Mariusz Mars is a 33 y.o. female presenting for/with:    Chief Complaint   Patient presents with    Pharyngitis     States her throat started to hurt on Sunday .. Denies fevers .. Painful to swallow .. Not taking anything otc    Headache       Vitals:    02/27/24 1507   Pulse: (!) 102   Resp: 18   Temp: 98.7 °F (37.1 °C)   TempSrc: Temporal   SpO2: 98%       Pain Scale: 0 - No pain/10  Pain Location:     \"Have you been to the ER, urgent care clinic since your last visit?  Hospitalized since your last visit?\"    NO    “Have you seen or consulted any other health care providers outside of [facility] since your last visit?”    NO                 8/28/2023     2:08 PM   PHQ-9    Little interest or pleasure in doing things 0   Feeling down, depressed, or hopeless 0   Trouble falling or staying asleep, or sleeping too much 0   Feeling tired or having little energy 0   Poor appetite or overeating 0   Feeling bad about yourself - or that you are a failure or have let yourself or your family down 0   Trouble concentrating on things, such as reading the newspaper or watching television 0   Moving or speaking so slowly that other people could have noticed. Or the opposite - being so fidgety or restless that you have been moving around a lot more than usual 0   Thoughts that you would be better off dead, or of hurting yourself in some way 0   PHQ-2 Score 0   PHQ-9 Total Score 0   If you checked off any problems, how difficult have these problems made it for you to do your work, take care of things at home, or get along with other people? 0           6/9/2023    10:40 AM 5/13/2022    12:00 AM 6/29/2021    12:00 AM   [facility] AMB LEARNING ASSESSMENT   Primary Learner Patient Patient Patient   Primary Language ENGLISH ENGLISH ENGLISH   Learning Preference DEMONSTRATION READING READING   Answered By patient pt patient   Relationship to Learner SELF SELF SELF            8/19/2022     8:29 AM   Amb Fall Risk Assessment

## 2024-02-29 ENCOUNTER — PATIENT MESSAGE (OUTPATIENT)
Age: 34
End: 2024-02-29

## 2024-02-29 DIAGNOSIS — L01.00 IMPETIGO: Primary | ICD-10-CM

## 2024-03-01 NOTE — TELEPHONE ENCOUNTER
From: Mariusz Mars  To: Randi Ye  Sent: 2/29/2024 1:40 PM EST  Subject: Strep    Hi Randi,    My throat is still on fire and feels like I’m swallowing razor blades. I’m somehow super congested so i‘ve been taking Tylenol sinus along with the antibiotic.     My new concern is that I think I’m getting impetigo above my lip. It’s red and burning. Almost every time I get strep, I get impetigo above my lip or under my eye from the same bacteria. I’ve attached a picture. Mupirocin usually works to get rid of it. Do I need to come in again for that?

## 2024-05-24 ENCOUNTER — OFFICE VISIT (OUTPATIENT)
Age: 34
End: 2024-05-24
Payer: COMMERCIAL

## 2024-05-24 VITALS — OXYGEN SATURATION: 96 % | RESPIRATION RATE: 18 BRPM | HEART RATE: 88 BPM | TEMPERATURE: 98 F

## 2024-05-24 DIAGNOSIS — J02.0 ACUTE STREPTOCOCCAL PHARYNGITIS: ICD-10-CM

## 2024-05-24 DIAGNOSIS — J02.9 SORE THROAT: Primary | ICD-10-CM

## 2024-05-24 LAB
GROUP A STREP ANTIGEN, POC: POSITIVE
VALID INTERNAL CONTROL, POC: NORMAL

## 2024-05-24 PROCEDURE — 99213 OFFICE O/P EST LOW 20 MIN: CPT | Performed by: NURSE PRACTITIONER

## 2024-05-24 PROCEDURE — 87880 STREP A ASSAY W/OPTIC: CPT | Performed by: NURSE PRACTITIONER

## 2024-05-24 RX ORDER — AMOXICILLIN 500 MG/1
500 CAPSULE ORAL 2 TIMES DAILY
Qty: 20 CAPSULE | Refills: 0 | Status: SHIPPED | OUTPATIENT
Start: 2024-05-24 | End: 2024-06-03

## 2024-05-24 ASSESSMENT — PATIENT HEALTH QUESTIONNAIRE - PHQ9
SUM OF ALL RESPONSES TO PHQ QUESTIONS 1-9: 0
SUM OF ALL RESPONSES TO PHQ QUESTIONS 1-9: 0
2. FEELING DOWN, DEPRESSED OR HOPELESS: NOT AT ALL
SUM OF ALL RESPONSES TO PHQ QUESTIONS 1-9: 0
SUM OF ALL RESPONSES TO PHQ QUESTIONS 1-9: 0
SUM OF ALL RESPONSES TO PHQ9 QUESTIONS 1 & 2: 0
1. LITTLE INTEREST OR PLEASURE IN DOING THINGS: NOT AT ALL

## 2024-05-24 NOTE — PROGRESS NOTES
Chief Complaint   Patient presents with    Pharyngitis     Started on Wed with sore throat ... Denies fever, cough or headaches .. Using Tylenol and had some leftover Amoxicillin she started on Wed and states she is actually better today          HPI:       is a 33 y.o. female.        New Issues:  She is here for an acute visit.  She reports her kids have had strep recently.  She has had a sore throat since Wednesday.  Took some leftover Amoxicillin that helped some.  Denies fever, cough or chills.  Using some Tylenol at home.     Allergies   Allergen Reactions    Peanut-Containing Drug Products Anaphylaxis, Rash, Itching and Other (See Comments)     Allergic to all nuts  Allergic to all nuts      Shellfish-Derived Products Hives, Anaphylaxis, Rash and Itching    Peanut (Diagnostic) Hives       Current Outpatient Medications   Medication Sig Dispense Refill    amoxicillin (AMOXIL) 500 MG capsule Take 1 capsule by mouth 2 times daily for 10 days 20 capsule 0    Semaglutide-Weight Management (WEGOVY) 1.7 MG/0.75ML SOAJ SC injection Inject 1.7 mg into the skin every 7 days 3 mL 2    semaglutide, 2 MG/DOSE, (OZEMPIC, 2 MG/DOSE,) 8 MG/3ML SOPN sc injection INJECT 2 MG INTO THE SKIN EVERY 7 DAYS 3 mL 3    venlafaxine (EFFEXOR XR) 37.5 MG extended release capsule Take 1 capsule by mouth daily 90 capsule 3    albuterol sulfate HFA (PROVENTIL;VENTOLIN;PROAIR) 108 (90 Base) MCG/ACT inhaler Inhale 2 puffs into the lungs every 4 hours as needed      dupilumab (DUPIXENT) 300 MG/2ML SOPN injection ceived the following from [facility]: Outside name: Dupixent Pen 300 mg/2 mL pnij       No current facility-administered medications for this visit.        Past Medical History:   Diagnosis Date    Asthma     Headache     High-risk pregnancy 12/23/2015    Entered By: Craol Jerry MDSigned By: Carol Jerry MDStop Reason: Resolved Description: High risk pregnancy code: V23.89    Recurrent major

## 2024-05-24 NOTE — PROGRESS NOTES
Mariusz Mars is a 33 y.o. female presenting for/with:    Chief Complaint   Patient presents with    Pharyngitis     Started on Wed with sore throat ... Denies fever, cough or headaches .. Using Tylenol and had some leftover Amoxicillin she started on Wed and states she is actually better today        Vitals:    05/24/24 0844   Pulse: 88   Resp: 18   Temp: 98 °F (36.7 °C)   TempSrc: Temporal   SpO2: 96%       Pain Scale: 0 - No pain/10  Pain Location:     \"Have you been to the ER, urgent care clinic since your last visit?  Hospitalized since your last visit?\"    NO    “Have you seen or consulted any other health care providers outside of [facility] since your last visit?”    NO                 5/24/2024     8:44 AM   PHQ-9    Little interest or pleasure in doing things 0   Feeling down, depressed, or hopeless 0   PHQ-2 Score 0   PHQ-9 Total Score 0           6/9/2023    10:40 AM 5/13/2022    12:00 AM 6/29/2021    12:00 AM   [facility] AMB LEARNING ASSESSMENT   Primary Learner Patient Patient Patient   Primary Language ENGLISH ENGLISH ENGLISH   Learning Preference DEMONSTRATION READING READING   Answered By patient pt patient   Relationship to Learner SELF SELF SELF            8/19/2022     8:29 AM   Amb Fall Risk Assessment and TUG Test   Total Score 1           8/28/2023     2:00 PM 6/9/2023    10:00 AM   ADL ASSESSMENT   Feeding yourself No Help Needed No Help Needed   Getting from bed to chair No Help Needed No Help Needed   Getting dressed No Help Needed No Help Needed   Bathing or showering No Help Needed No Help Needed   Walk across the room (includes cane/walker) No Help Needed No Help Needed   Using the telphone No Help Needed No Help Needed   Taking your medications No Help Needed No Help Needed   Preparing meals No Help Needed No Help Needed   Managing money (expenses/bills) No Help Needed No Help Needed   Moderately strenuous housework (laundry) No Help Needed No Help Needed   Shopping for

## 2024-06-13 LAB
ABO, EXTERNAL RESULT: NORMAL
HEPATITIS C ANTIBODY, EXTERNAL RESULT: NORMAL
RH FACTOR, EXTERNAL RESULT: NORMAL

## 2024-06-15 LAB
HEP B, EXTERNAL RESULT: NORMAL
HIV, EXTERNAL RESULT: NON REACTIVE
RPR, EXTERNAL RESULT: NON REACTIVE
RUBELLA TITER, EXTERNAL RESULT: NORMAL

## 2024-12-06 LAB — GBS, EXTERNAL RESULT: NORMAL

## 2024-12-10 ENCOUNTER — HOSPITAL ENCOUNTER (INPATIENT)
Facility: HOSPITAL | Age: 34
LOS: 3 days | Discharge: HOME | End: 2024-12-13
Attending: SPECIALIST
Payer: COMMERCIAL

## 2024-12-10 PROBLEM — O13.3 GESTATIONAL (PREGNANCY-INDUCED) HYPERTENSION WITHOUT SIGNIFICANT PROTEINURIA, THIRD TRIMESTER: Status: ACTIVE | Noted: 2024-12-10

## 2024-12-10 LAB
ABO + RH BLD: NORMAL
ALBUMIN SERPL-MCNC: 2.2 G/DL (ref 3.5–5)
ALBUMIN/GLOB SERPL: 0.5 (ref 1.1–2.2)
ALP SERPL-CCNC: 178 U/L (ref 45–117)
ALT SERPL-CCNC: 24 U/L (ref 12–78)
AMPHET UR QL SCN: NEGATIVE
ANION GAP SERPL CALC-SCNC: 5 MMOL/L (ref 2–12)
AST SERPL-CCNC: 31 U/L (ref 15–37)
BARBITURATES UR QL SCN: NEGATIVE
BASOPHILS # BLD: 0 K/UL (ref 0–0.1)
BASOPHILS NFR BLD: 0 % (ref 0–1)
BENZODIAZ UR QL: NEGATIVE
BILIRUB SERPL-MCNC: 0.3 MG/DL (ref 0.2–1)
BLOOD GROUP ANTIBODIES SERPL: NORMAL
BUN SERPL-MCNC: 9 MG/DL (ref 6–20)
BUN/CREAT SERPL: 11 (ref 12–20)
CALCIUM SERPL-MCNC: 8.6 MG/DL (ref 8.5–10.1)
CANNABINOIDS UR QL SCN: NEGATIVE
CHLORIDE SERPL-SCNC: 110 MMOL/L (ref 97–108)
CO2 SERPL-SCNC: 23 MMOL/L (ref 21–32)
COCAINE UR QL SCN: NEGATIVE
CREAT SERPL-MCNC: 0.85 MG/DL (ref 0.55–1.02)
DIFFERENTIAL METHOD BLD: ABNORMAL
EOSINOPHIL # BLD: 0.6 K/UL (ref 0–0.4)
EOSINOPHIL NFR BLD: 5 % (ref 0–7)
ERYTHROCYTE [DISTWIDTH] IN BLOOD BY AUTOMATED COUNT: 14 % (ref 11.5–14.5)
GLOBULIN SER CALC-MCNC: 4.3 G/DL (ref 2–4)
GLUCOSE SERPL-MCNC: 107 MG/DL (ref 65–100)
HCT VFR BLD AUTO: 34.9 % (ref 35–47)
HGB BLD-MCNC: 11.5 G/DL (ref 11.5–16)
IMM GRANULOCYTES # BLD AUTO: 0.1 K/UL (ref 0–0.04)
IMM GRANULOCYTES NFR BLD AUTO: 1 % (ref 0–0.5)
LYMPHOCYTES # BLD: 2.7 K/UL (ref 0.8–3.5)
LYMPHOCYTES NFR BLD: 23 % (ref 12–49)
Lab: NORMAL
MCH RBC QN AUTO: 30.6 PG (ref 26–34)
MCHC RBC AUTO-ENTMCNC: 33 G/DL (ref 30–36.5)
MCV RBC AUTO: 92.8 FL (ref 80–99)
METHADONE UR QL: NEGATIVE
MONOCYTES # BLD: 1.4 K/UL (ref 0–1)
MONOCYTES NFR BLD: 11 % (ref 5–13)
NEUTS SEG # BLD: 7.1 K/UL (ref 1.8–8)
NEUTS SEG NFR BLD: 60 % (ref 32–75)
NRBC # BLD: 0 K/UL (ref 0–0.01)
NRBC BLD-RTO: 0 PER 100 WBC
OPIATES UR QL: NEGATIVE
PCP UR QL: NEGATIVE
PLATELET # BLD AUTO: 251 K/UL (ref 150–400)
PMV BLD AUTO: 10.9 FL (ref 8.9–12.9)
POTASSIUM SERPL-SCNC: 3.7 MMOL/L (ref 3.5–5.1)
PROT SERPL-MCNC: 6.5 G/DL (ref 6.4–8.2)
RBC # BLD AUTO: 3.76 M/UL (ref 3.8–5.2)
SODIUM SERPL-SCNC: 138 MMOL/L (ref 136–145)
SPECIMEN EXP DATE BLD: NORMAL
WBC # BLD AUTO: 11.9 K/UL (ref 3.6–11)

## 2024-12-10 PROCEDURE — 6370000000 HC RX 637 (ALT 250 FOR IP)

## 2024-12-10 PROCEDURE — 4A1HXCZ MONITORING OF PRODUCTS OF CONCEPTION, CARDIAC RATE, EXTERNAL APPROACH: ICD-10-PCS | Performed by: SPECIALIST

## 2024-12-10 PROCEDURE — 80053 COMPREHEN METABOLIC PANEL: CPT

## 2024-12-10 PROCEDURE — 86592 SYPHILIS TEST NON-TREP QUAL: CPT

## 2024-12-10 PROCEDURE — 36415 COLL VENOUS BLD VENIPUNCTURE: CPT

## 2024-12-10 PROCEDURE — 1120000000 HC RM PRIVATE OB

## 2024-12-10 PROCEDURE — 80307 DRUG TEST PRSMV CHEM ANLYZR: CPT

## 2024-12-10 PROCEDURE — 85025 COMPLETE CBC W/AUTO DIFF WBC: CPT

## 2024-12-10 PROCEDURE — 86901 BLOOD TYPING SEROLOGIC RH(D): CPT

## 2024-12-10 PROCEDURE — 3E033VJ INTRODUCTION OF OTHER HORMONE INTO PERIPHERAL VEIN, PERCUTANEOUS APPROACH: ICD-10-PCS | Performed by: SPECIALIST

## 2024-12-10 PROCEDURE — 86900 BLOOD TYPING SEROLOGIC ABO: CPT

## 2024-12-10 PROCEDURE — 86850 RBC ANTIBODY SCREEN: CPT

## 2024-12-10 RX ORDER — SODIUM CHLORIDE 0.9 % (FLUSH) 0.9 %
5-40 SYRINGE (ML) INJECTION EVERY 12 HOURS SCHEDULED
Status: DISCONTINUED | OUTPATIENT
Start: 2024-12-10 | End: 2024-12-11

## 2024-12-10 RX ORDER — SODIUM CHLORIDE 0.9 % (FLUSH) 0.9 %
5-40 SYRINGE (ML) INJECTION PRN
Status: DISCONTINUED | OUTPATIENT
Start: 2024-12-10 | End: 2024-12-11

## 2024-12-10 RX ORDER — ONDANSETRON 4 MG/1
4 TABLET, ORALLY DISINTEGRATING ORAL EVERY 6 HOURS PRN
Status: DISCONTINUED | OUTPATIENT
Start: 2024-12-10 | End: 2024-12-11 | Stop reason: SDUPTHER

## 2024-12-10 RX ORDER — METHYLERGONOVINE MALEATE 0.2 MG/ML
200 INJECTION INTRAVENOUS PRN
Status: DISCONTINUED | OUTPATIENT
Start: 2024-12-10 | End: 2024-12-13 | Stop reason: HOSPADM

## 2024-12-10 RX ORDER — SODIUM CHLORIDE, SODIUM LACTATE, POTASSIUM CHLORIDE, CALCIUM CHLORIDE 600; 310; 30; 20 MG/100ML; MG/100ML; MG/100ML; MG/100ML
INJECTION, SOLUTION INTRAVENOUS CONTINUOUS
Status: DISCONTINUED | OUTPATIENT
Start: 2024-12-10 | End: 2024-12-12

## 2024-12-10 RX ORDER — TERBUTALINE SULFATE 1 MG/ML
0.25 INJECTION, SOLUTION SUBCUTANEOUS
Status: COMPLETED | OUTPATIENT
Start: 2024-12-10 | End: 2024-12-11

## 2024-12-10 RX ORDER — FENTANYL CITRATE 50 UG/ML
25 INJECTION, SOLUTION INTRAMUSCULAR; INTRAVENOUS
Status: DISCONTINUED | OUTPATIENT
Start: 2024-12-10 | End: 2024-12-11

## 2024-12-10 RX ORDER — DOCUSATE SODIUM 100 MG/1
100 CAPSULE, LIQUID FILLED ORAL 2 TIMES DAILY
Status: DISCONTINUED | OUTPATIENT
Start: 2024-12-10 | End: 2024-12-11 | Stop reason: SDUPTHER

## 2024-12-10 RX ORDER — DIPHENHYDRAMINE HYDROCHLORIDE 50 MG/ML
25 INJECTION INTRAMUSCULAR; INTRAVENOUS ONCE
Status: COMPLETED | OUTPATIENT
Start: 2024-12-10 | End: 2024-12-11

## 2024-12-10 RX ORDER — SODIUM CHLORIDE, SODIUM LACTATE, POTASSIUM CHLORIDE, AND CALCIUM CHLORIDE .6; .31; .03; .02 G/100ML; G/100ML; G/100ML; G/100ML
500 INJECTION, SOLUTION INTRAVENOUS PRN
Status: DISCONTINUED | OUTPATIENT
Start: 2024-12-10 | End: 2024-12-12

## 2024-12-10 RX ORDER — SODIUM CHLORIDE 9 MG/ML
25 INJECTION, SOLUTION INTRAVENOUS PRN
Status: DISCONTINUED | OUTPATIENT
Start: 2024-12-10 | End: 2024-12-12

## 2024-12-10 RX ORDER — MISOPROSTOL 200 UG/1
400 TABLET ORAL PRN
Status: DISCONTINUED | OUTPATIENT
Start: 2024-12-10 | End: 2024-12-11

## 2024-12-10 RX ORDER — MULTIVIT-MIN/IRON/FOLIC ACID/K 18-600-40
2000 CAPSULE ORAL DAILY
COMMUNITY

## 2024-12-10 RX ORDER — ASPIRIN 81 MG/1
81 TABLET, CHEWABLE ORAL DAILY
Status: ON HOLD | COMMUNITY
End: 2024-12-13 | Stop reason: HOSPADM

## 2024-12-10 RX ORDER — CARBOPROST TROMETHAMINE 250 UG/ML
250 INJECTION, SOLUTION INTRAMUSCULAR PRN
Status: DISCONTINUED | OUTPATIENT
Start: 2024-12-10 | End: 2024-12-11

## 2024-12-10 RX ORDER — ONDANSETRON 2 MG/ML
4 INJECTION INTRAMUSCULAR; INTRAVENOUS EVERY 6 HOURS PRN
Status: DISCONTINUED | OUTPATIENT
Start: 2024-12-10 | End: 2024-12-11 | Stop reason: SDUPTHER

## 2024-12-10 RX ADMIN — MISOPROSTOL 50 MCG: 100 TABLET ORAL at 19:26

## 2024-12-10 RX ADMIN — MISOPROSTOL 50 MCG: 100 TABLET ORAL at 23:37

## 2024-12-10 NOTE — PROGRESS NOTES
Mariusz Mars is a 33 y.o.  at Unknown patient of  Amanda  who presents to L&D for scheduled IOL for Gestational HTN. She reports Positive FM, denies vaginal bleeding, LOF, and contractions. She also denies Headaches, Scotoma, RUQ pain, and Edema. Urine sample obtained. EFM and toco placed for initial assessment.

## 2024-12-10 NOTE — H&P
History & Physical    Name: Mariusz Mars MRN: [id number]  SSN: [id number]    YOB: 1990  Age: 33 y.o.  Sex: female      Subjective:     Estimated Date of Delivery: 24  OB History    Para Term  AB Living   3 2 2     2   SAB IAB Ectopic Molar Multiple Live Births             2      # Outcome Date GA Lbr Guzman/2nd Weight Sex Type Anes PTL Lv   3 Current            2 Term 22 37w1d / 00:07 3.22 kg (7 lb 1.6 oz) M Vag-Spont EPI N KYLAH   1 Term 16 39w3d 09:41 / 00:50 2.78 kg (6 lb 2.1 oz) M   N KYLAH       Ms. Mars is admitted with pregnancy at 37w0d for induction of labor due to hypertension. Prenatal course was complicated by pregnancy induced hypertension.  Please see prenatal records for details.    Past Medical History:   Diagnosis Date    Asthma     COVID-19     Eczema     Headache     High-risk pregnancy 2015    Entered By: Carol Jerry MDSigned By: Carol Jerry MDStop Reason: Resolved Description: High risk pregnancy code: V23.89    Hypertension     Recurrent major depressive disorder, in full remission ([facility]) 2018    h/o     Past Surgical History:   Procedure Laterality Date    OTHER SURGICAL HISTORY      eczema     Social History     Occupational History    Not on file   Tobacco Use    Smoking status: Never    Smokeless tobacco: Never   Substance and Sexual Activity    Alcohol use: No    Drug use: Never    Sexual activity: Yes     Partners: Male     Family History   Problem Relation Age of Onset    Allergy (Severe) Mother     Asthma Mother     High Blood Pressure Father        Allergies   Allergen Reactions    Peanut-Containing Drug Products Anaphylaxis, Rash, Itching and Other (See Comments)     Allergic to all nuts  Allergic to all nuts      Shellfish-Derived Products Hives, Anaphylaxis, Rash and Itching    Peanut (Diagnostic) Hives     Prior to Admission medications    Medication Sig Start Date End Date Taking? Authorizing Provider   Prenatal

## 2024-12-11 ENCOUNTER — ANESTHESIA EVENT (OUTPATIENT)
Dept: LABOR AND DELIVERY | Facility: HOSPITAL | Age: 34
End: 2024-12-11
Payer: COMMERCIAL

## 2024-12-11 ENCOUNTER — ANESTHESIA (OUTPATIENT)
Dept: LABOR AND DELIVERY | Facility: HOSPITAL | Age: 34
End: 2024-12-11
Payer: COMMERCIAL

## 2024-12-11 LAB — RPR SER QL: NONREACTIVE

## 2024-12-11 PROCEDURE — 6360000002 HC RX W HCPCS

## 2024-12-11 PROCEDURE — 88307 TISSUE EXAM BY PATHOLOGIST: CPT

## 2024-12-11 PROCEDURE — 3609079900 HC CESAREAN SECTION: Performed by: SPECIALIST

## 2024-12-11 PROCEDURE — 3700000000 HC ANESTHESIA ATTENDED CARE: Performed by: SPECIALIST

## 2024-12-11 PROCEDURE — 6370000000 HC RX 637 (ALT 250 FOR IP): Performed by: SPECIALIST

## 2024-12-11 PROCEDURE — 2709999900 HC NON-CHARGEABLE SUPPLY: Performed by: SPECIALIST

## 2024-12-11 PROCEDURE — 7210000100 HC LABOR FEE PER 1 HR

## 2024-12-11 PROCEDURE — 3700000156 HC EPIDURAL ANESTHESIA

## 2024-12-11 PROCEDURE — 2580000003 HC RX 258: Performed by: SPECIALIST

## 2024-12-11 PROCEDURE — 2500000003 HC RX 250 WO HCPCS: Performed by: STUDENT IN AN ORGANIZED HEALTH CARE EDUCATION/TRAINING PROGRAM

## 2024-12-11 PROCEDURE — 7100000000 HC PACU RECOVERY - FIRST 15 MIN: Performed by: SPECIALIST

## 2024-12-11 PROCEDURE — 6370000000 HC RX 637 (ALT 250 FOR IP)

## 2024-12-11 PROCEDURE — 59200 INSERT CERVICAL DILATOR: CPT

## 2024-12-11 PROCEDURE — 7100000001 HC PACU RECOVERY - ADDTL 15 MIN: Performed by: SPECIALIST

## 2024-12-11 PROCEDURE — 3700000001 HC ADD 15 MINUTES (ANESTHESIA): Performed by: SPECIALIST

## 2024-12-11 PROCEDURE — 6360000002 HC RX W HCPCS: Performed by: SPECIALIST

## 2024-12-11 PROCEDURE — 2580000003 HC RX 258

## 2024-12-11 PROCEDURE — 2580000003 HC RX 258: Performed by: STUDENT IN AN ORGANIZED HEALTH CARE EDUCATION/TRAINING PROGRAM

## 2024-12-11 PROCEDURE — 6370000000 HC RX 637 (ALT 250 FOR IP): Performed by: OBSTETRICS & GYNECOLOGY

## 2024-12-11 PROCEDURE — 6360000002 HC RX W HCPCS: Performed by: STUDENT IN AN ORGANIZED HEALTH CARE EDUCATION/TRAINING PROGRAM

## 2024-12-11 PROCEDURE — 1120000000 HC RM PRIVATE OB

## 2024-12-11 RX ORDER — BUPIVACAINE HYDROCHLORIDE 7.5 MG/ML
INJECTION, SOLUTION INTRASPINAL
Status: DISCONTINUED | OUTPATIENT
Start: 2024-12-11 | End: 2024-12-11 | Stop reason: SDUPTHER

## 2024-12-11 RX ORDER — DIPHENHYDRAMINE HYDROCHLORIDE 50 MG/ML
25 INJECTION INTRAMUSCULAR; INTRAVENOUS EVERY 6 HOURS PRN
Status: DISCONTINUED | OUTPATIENT
Start: 2024-12-11 | End: 2024-12-11

## 2024-12-11 RX ORDER — ONDANSETRON 4 MG/1
4 TABLET, ORALLY DISINTEGRATING ORAL EVERY 8 HOURS PRN
Status: DISCONTINUED | OUTPATIENT
Start: 2024-12-11 | End: 2024-12-13 | Stop reason: HOSPADM

## 2024-12-11 RX ORDER — SODIUM CHLORIDE 0.9 % (FLUSH) 0.9 %
5-40 SYRINGE (ML) INJECTION EVERY 12 HOURS SCHEDULED
Status: DISCONTINUED | OUTPATIENT
Start: 2024-12-11 | End: 2024-12-13 | Stop reason: HOSPADM

## 2024-12-11 RX ORDER — DIPHENHYDRAMINE HCL 25 MG
25 CAPSULE ORAL EVERY 6 HOURS PRN
Status: DISCONTINUED | OUTPATIENT
Start: 2024-12-11 | End: 2024-12-13 | Stop reason: HOSPADM

## 2024-12-11 RX ORDER — IBUPROFEN 400 MG/1
800 TABLET, FILM COATED ORAL EVERY 8 HOURS
Status: DISCONTINUED | OUTPATIENT
Start: 2024-12-12 | End: 2024-12-12

## 2024-12-11 RX ORDER — MISOPROSTOL 200 UG/1
TABLET ORAL
Status: DISPENSED
Start: 2024-12-11 | End: 2024-12-11

## 2024-12-11 RX ORDER — FENTANYL CITRATE 50 UG/ML
INJECTION, SOLUTION INTRAMUSCULAR; INTRAVENOUS
Status: DISCONTINUED | OUTPATIENT
Start: 2024-12-11 | End: 2024-12-11 | Stop reason: SDUPTHER

## 2024-12-11 RX ORDER — SODIUM CHLORIDE 0.9 % (FLUSH) 0.9 %
5-40 SYRINGE (ML) INJECTION PRN
Status: DISCONTINUED | OUTPATIENT
Start: 2024-12-11 | End: 2024-12-13 | Stop reason: HOSPADM

## 2024-12-11 RX ORDER — OXYCODONE HYDROCHLORIDE 5 MG/1
10 TABLET ORAL EVERY 4 HOURS PRN
Status: DISCONTINUED | OUTPATIENT
Start: 2024-12-11 | End: 2024-12-13 | Stop reason: HOSPADM

## 2024-12-11 RX ORDER — EPHEDRINE SULFATE/0.9% NACL/PF 50 MG/5 ML
SYRINGE (ML) INTRAVENOUS
Status: DISCONTINUED | OUTPATIENT
Start: 2024-12-11 | End: 2024-12-11 | Stop reason: SDUPTHER

## 2024-12-11 RX ORDER — KETOROLAC TROMETHAMINE 30 MG/ML
INJECTION, SOLUTION INTRAMUSCULAR; INTRAVENOUS
Status: DISCONTINUED | OUTPATIENT
Start: 2024-12-11 | End: 2024-12-11 | Stop reason: SDUPTHER

## 2024-12-11 RX ORDER — ONDANSETRON 2 MG/ML
4 INJECTION INTRAMUSCULAR; INTRAVENOUS EVERY 6 HOURS PRN
Status: DISCONTINUED | OUTPATIENT
Start: 2024-12-11 | End: 2024-12-13 | Stop reason: HOSPADM

## 2024-12-11 RX ORDER — ONDANSETRON 2 MG/ML
INJECTION INTRAMUSCULAR; INTRAVENOUS
Status: DISCONTINUED | OUTPATIENT
Start: 2024-12-11 | End: 2024-12-11 | Stop reason: SDUPTHER

## 2024-12-11 RX ORDER — MISOPROSTOL 100 UG/1
TABLET ORAL PRN
Status: DISCONTINUED | OUTPATIENT
Start: 2024-12-11 | End: 2024-12-11 | Stop reason: ALTCHOICE

## 2024-12-11 RX ORDER — SODIUM CHLORIDE 9 MG/ML
INJECTION, SOLUTION INTRAVENOUS PRN
Status: DISCONTINUED | OUTPATIENT
Start: 2024-12-11 | End: 2024-12-13 | Stop reason: HOSPADM

## 2024-12-11 RX ORDER — EPHEDRINE SULFATE 5 MG/ML
50 INJECTION INTRAVENOUS ONCE
Status: COMPLETED | OUTPATIENT
Start: 2024-12-11 | End: 2024-12-11

## 2024-12-11 RX ORDER — ACETAMINOPHEN 500 MG
1000 TABLET ORAL EVERY 8 HOURS
Status: DISCONTINUED | OUTPATIENT
Start: 2024-12-11 | End: 2024-12-13 | Stop reason: HOSPADM

## 2024-12-11 RX ORDER — OXYCODONE HYDROCHLORIDE 5 MG/1
5 TABLET ORAL EVERY 4 HOURS PRN
Status: DISCONTINUED | OUTPATIENT
Start: 2024-12-11 | End: 2024-12-13 | Stop reason: HOSPADM

## 2024-12-11 RX ORDER — MORPHINE SULFATE 1 MG/ML
INJECTION, SOLUTION EPIDURAL; INTRATHECAL; INTRAVENOUS
Status: DISCONTINUED | OUTPATIENT
Start: 2024-12-11 | End: 2024-12-11 | Stop reason: SDUPTHER

## 2024-12-11 RX ORDER — DOCUSATE SODIUM 100 MG/1
100 CAPSULE, LIQUID FILLED ORAL 2 TIMES DAILY
Status: DISCONTINUED | OUTPATIENT
Start: 2024-12-11 | End: 2024-12-13 | Stop reason: HOSPADM

## 2024-12-11 RX ORDER — PHENYLEPHRINE HCL IN 0.9% NACL 0.4MG/10ML
SYRINGE (ML) INTRAVENOUS
Status: DISCONTINUED | OUTPATIENT
Start: 2024-12-11 | End: 2024-12-11 | Stop reason: SDUPTHER

## 2024-12-11 RX ORDER — KETOROLAC TROMETHAMINE 30 MG/ML
30 INJECTION, SOLUTION INTRAMUSCULAR; INTRAVENOUS EVERY 6 HOURS
Status: DISCONTINUED | OUTPATIENT
Start: 2024-12-11 | End: 2024-12-12

## 2024-12-11 RX ORDER — DEXAMETHASONE SODIUM PHOSPHATE 4 MG/ML
INJECTION, SOLUTION INTRA-ARTICULAR; INTRALESIONAL; INTRAMUSCULAR; INTRAVENOUS; SOFT TISSUE
Status: DISCONTINUED | OUTPATIENT
Start: 2024-12-11 | End: 2024-12-11 | Stop reason: SDUPTHER

## 2024-12-11 RX ADMIN — Medication 80 MCG: at 09:52

## 2024-12-11 RX ADMIN — CEFAZOLIN 3000 MG: 10 INJECTION, POWDER, FOR SOLUTION INTRAVENOUS at 09:57

## 2024-12-11 RX ADMIN — PHENYLEPHRINE HYDROCHLORIDE 40 MCG/MIN: 10 INJECTION INTRAVENOUS at 09:49

## 2024-12-11 RX ADMIN — SODIUM CHLORIDE, PRESERVATIVE FREE 10 ML: 5 INJECTION INTRAVENOUS at 22:38

## 2024-12-11 RX ADMIN — MISOPROSTOL 50 MCG: 100 TABLET ORAL at 04:52

## 2024-12-11 RX ADMIN — FENTANYL CITRATE 15 MCG: 50 INJECTION, SOLUTION INTRAMUSCULAR; INTRAVENOUS at 09:48

## 2024-12-11 RX ADMIN — ONDANSETRON HYDROCHLORIDE 4 MG: 2 INJECTION, SOLUTION INTRAMUSCULAR; INTRAVENOUS at 09:53

## 2024-12-11 RX ADMIN — MORPHINE SULFATE 0.1 MG: 1 INJECTION, SOLUTION EPIDURAL; INTRATHECAL; INTRAVENOUS at 09:48

## 2024-12-11 RX ADMIN — DOCUSATE SODIUM 100 MG: 100 CAPSULE, LIQUID FILLED ORAL at 22:38

## 2024-12-11 RX ADMIN — Medication 10 MG: at 09:55

## 2024-12-11 RX ADMIN — KETOROLAC TROMETHAMINE 30 MG: 30 INJECTION, SOLUTION INTRAMUSCULAR; INTRAVENOUS at 10:22

## 2024-12-11 RX ADMIN — BUPIVACAINE HYDROCHLORIDE IN DEXTROSE 1.6 ML: 7.5 INJECTION, SOLUTION SUBARACHNOID at 09:48

## 2024-12-11 RX ADMIN — KETOROLAC TROMETHAMINE 30 MG: 30 INJECTION, SOLUTION INTRAMUSCULAR at 16:18

## 2024-12-11 RX ADMIN — Medication 80 MCG: at 09:55

## 2024-12-11 RX ADMIN — DEXAMETHASONE SODIUM PHOSPHATE 8 MG: 4 INJECTION, SOLUTION INTRAMUSCULAR; INTRAVENOUS at 09:53

## 2024-12-11 RX ADMIN — Medication 909 MILLI-UNITS/MIN: at 10:09

## 2024-12-11 RX ADMIN — KETOROLAC TROMETHAMINE 30 MG: 30 INJECTION, SOLUTION INTRAMUSCULAR at 22:37

## 2024-12-11 RX ADMIN — TERBUTALINE SULFATE 0.25 MG: 1 INJECTION, SOLUTION SUBCUTANEOUS at 09:01

## 2024-12-11 RX ADMIN — DIPHENHYDRAMINE HYDROCHLORIDE 25 MG: 50 INJECTION INTRAMUSCULAR; INTRAVENOUS at 04:32

## 2024-12-11 RX ADMIN — Medication 120 MCG: at 10:14

## 2024-12-11 RX ADMIN — ACETAMINOPHEN 1000 MG: 500 TABLET ORAL at 14:33

## 2024-12-11 RX ADMIN — SODIUM CHLORIDE, POTASSIUM CHLORIDE, SODIUM LACTATE AND CALCIUM CHLORIDE 500 ML: 600; 310; 30; 20 INJECTION, SOLUTION INTRAVENOUS at 09:02

## 2024-12-11 RX ADMIN — DIPHENHYDRAMINE HYDROCHLORIDE 25 MG: 25 CAPSULE ORAL at 23:07

## 2024-12-11 RX ADMIN — FENTANYL CITRATE 25 MCG: 50 INJECTION INTRAMUSCULAR; INTRAVENOUS at 02:28

## 2024-12-11 RX ADMIN — EPHEDRINE SULFATE 50 MG: 5 INJECTION INTRAVENOUS at 11:19

## 2024-12-11 RX ADMIN — ACETAMINOPHEN 1000 MG: 500 TABLET ORAL at 23:06

## 2024-12-11 RX ADMIN — Medication 10 MG: at 10:14

## 2024-12-11 ASSESSMENT — PAIN SCALES - GENERAL
PAINLEVEL_OUTOF10: 1
PAINLEVEL_OUTOF10: 0
PAINLEVEL_OUTOF10: 2
PAINLEVEL_OUTOF10: 4

## 2024-12-11 ASSESSMENT — PAIN DESCRIPTION - DESCRIPTORS
DESCRIPTORS: ACHING
DESCRIPTORS: ACHING
DESCRIPTORS: CRAMPING

## 2024-12-11 ASSESSMENT — PAIN DESCRIPTION - LOCATION
LOCATION: ABDOMEN

## 2024-12-11 ASSESSMENT — PAIN - FUNCTIONAL ASSESSMENT: PAIN_FUNCTIONAL_ASSESSMENT: ACTIVITIES ARE NOT PREVENTED

## 2024-12-11 NOTE — LACTATION NOTE
This note was copied from a baby's chart.     24 1112   Visit Information   Lactation Consult Visit Type IP Initial Consult   Visit Length 60 minutes   Reason for Visit Education;Normal [place] Visit   Breast Feeding History/Assessment   Left Breast Soft  (no colostrum expressed   Left Nipple Protrude   Right Nipple Protrude   Right Breast Soft  (no colostrum expressed)   Breastfeeding History Yes  (BF her first 4 babies for about 4 weeks)   Feeding Assessment: Maternal Factors   Position and Latch With assistance   Signs of Transfer Nutritive sucking   Maternal Response Relaxed and confident   Left Side Feeding   Infant Latch Observations Rooting;Wide open mouth;Good latch on;Sustained rhythmic suck   Infant Position Football   Infant Response to Feeding Feeding well   Right Side Feeding   Infant Latch Observations Rooting;Wide open mouth;VALERIE with repeated attempts;Sustained rhythmic suck   Infant Position Football   Infant Response to Feeding Feeding well   LATCH Documentation   Latch 1   Audible Swallowing 1   Type of Nipple 2   Comfort (Breast/Nipple) 2   Hold (Positioning) 1   LATCH Score 7   Care Plan/Breast Care   Breast Care Using breast pump;Pumping supply provided   Lactation Comment Mother states baby is having a good first feeding, assisted mother to obtain a deeper latch at visit that feels more comfortable. Educated on benefits of skin to skin, proper hand expression, and adequate output for day of life. Provided with a Medela hand pump and demonstrated.   Equipment: Medela PS, measured for 16-18 mm flange size, mother used 17 mm with last baby now 2 years old (could possibly order another pump with insurance)  [place] oral assessment: small gape       Reviewed the \"Your Guide to Breastfeeding\" booklet. Discussed the typical feeding characteristics in the 1st and 2nd DOL and signs of adequate intake. Demonstrated the asymmetric latch and observed baby showing good signs of transfer on the breast.

## 2024-12-11 NOTE — PROGRESS NOTES
Bedside shift change report given to SARA Domínguez RN and SARA Paniagua RN (oncoming nurse) by SARA Fleming RN (offgoing nurse). Report included the following information Nurse Handoff Report.     0815 DOTTY Wang CNM at bedside, SVE performed and strip reviewed. Orders received to start Pitocin and penicillin after pt eats breakfast.     0850 Pt called RN d/t c/o vaginal bleeding. RN to bedside. Large amount of blood seen on pad and clot x 1. Abdomen palpated, not soft between contractions.    0855 Dr. Proctor called to bedside to assess. SVE performed and strip reviewed.    0857 Dr. Lai called to bedside to assess.    RN at bedside. Continuous bleeding noted. Provider notified. Strip reviewed.    0910 Bedside and Verbal transfer of care  report given to ONEL Robles RN (oncoming nurse) by SARA Domínguez RN (offgoing nurse). Report included the following information Nurse Handoff Report.     0911 C/S called by Dr. Lai.

## 2024-12-11 NOTE — ANESTHESIA PRE PROCEDURE
Department of Anesthesiology  Preprocedure Note       Name:  Mariusz Mars   Age:  33 y.o.  :  1990                                          MRN:  [id number]         Date:  2024      Surgeon: Surgeon(s):  Edwardo Lai MD    Procedure: Procedure(s):   SECTION    Medications prior to admission:   Prior to Admission medications    Medication Sig Start Date End Date Taking? Authorizing Provider   Prenatal Multivit-Min-Fe-FA (PRENATAL 1 + IRON PO) Take by mouth   Yes Boyd Shelton MD   aspirin 81 MG chewable tablet Take 1 tablet by mouth daily   Yes Boyd Shelton MD   vitamin D 50 MCG (2000) CAPS capsule Take 1 capsule by mouth daily   Yes ProviderBoyd MD   Semaglutide-Weight Management (WEGOVY) 1.7 MG/0.75ML SOAJ SC injection Inject 1.7 mg into the skin every 7 days 3/14/24   Randi Ye APRN - NP   semaglutide, 2 MG/DOSE, (OZEMPIC, 2 MG/DOSE,) 8 MG/3ML SOPN sc injection INJECT 2 MG INTO THE SKIN EVERY 7 DAYS 24   Randi Ye APRN - NP   venlafaxine (EFFEXOR XR) 37.5 MG extended release capsule Take 1 capsule by mouth daily 10/10/23   Randi Ye APRN - NP   albuterol sulfate HFA (PROVENTIL;VENTOLIN;PROAIR) 108 (90 Base) MCG/ACT inhaler Inhale 2 puffs into the lungs every 4 hours as needed 1/12/15   Boyd Shelton MD   dupilumab (DUPIXENT) 300 MG/2ML SOPN injection ceived the following from [facility]: Outside name: Dupixent Pen 300 mg/2 mL pnij  Patient not taking: Reported on 12/10/2024 1/23/23   Automatic Reconciliation, Ar       Current medications:    Current Facility-Administered Medications   Medication Dose Route Frequency Provider Last Rate Last Admin    penicillin G potassium 5 Million Units in sodium chloride 0.9 % 100 mL IVPB (mini-bag)  5 Million Units IntraVENous Once Carol Wang APRN - CNM        penicillin G potassium 2.5 million units in 0.9% sodium chloride 100 mL IVPB  2.5 Million Units IntraVENous Q4H

## 2024-12-11 NOTE — PROGRESS NOTES
CNM Labor Progress Note     Patient: Mariusz Mars MRN: [id number]  SSN: [id number]    YOB: 1990  Age: 33 y.o.  Sex: female        Subjective:   Patient coping well with contractions s/p 1 dose of Fent for 6/10 pain with 3 doses of Cytotec. Pt rates pain 4/10 now. Denies HA, visual disturbances, RUQ pain. FOB at bedside for support.       Objective:   Patient Vitals for the past 4 hrs:   Temp Pulse Resp BP SpO2   24 0712 98.1 °F (36.7 °C) 100 16 (!) 133/95 97 %       Fetal heart baseline 135 , mod  variability, Pos accelerations, neg decelerations.   Uterine contractions q 2-4 minutes, mild to palpation, resting tone soft,   Sterile Vaginal Exam: 2 cm dilated/ 70 % effaced/ -3 station, cervix position soft  , fetal presentation vertex, membranes intact        Assessment:   Intrauterine pregnancy at term  Category 1 fetal heart rate tracing   IOL for GHTN s/p cytotec  GBS pos  Class 3 obesity        Plan:   Continue current orders/management   Cont to monitor BP  Cont EFM  Start pitocin per protocol this morning  PCN for GBS prophylaxis  Epidural when desired  CNM management   Anticipate     Carol Wang, JANAE - CNM

## 2024-12-11 NOTE — ANESTHESIA POSTPROCEDURE EVALUATION
Department of Anesthesiology  Postprocedure Note    Patient: Mariusz Mars  MRN: [id number]  YOB: 1990  Date of evaluation: 2024    Procedure Summary       Date: 24 Room / Location: [facility] L&D 02 / [facility] L&D OR    Anesthesia Start: 940 Anesthesia Stop:     Procedure:  SECTION Diagnosis:       37 weeks gestation of pregnancy      (Other (Comment))    Surgeons: Edwardo Lai MD Responsible Provider: Paul Prabhakar MD    Anesthesia Type: spinal ASA Status: 3 - Emergent            Anesthesia Type: No value filed.    Josefa Phase I:      Josefa Phase II:      Anesthesia Post Evaluation    Patient location during evaluation: PACU  Patient participation: complete - patient participated  Level of consciousness: awake and alert  Airway patency: patent  Nausea & Vomiting: no nausea  Cardiovascular status: hemodynamically stable  Respiratory status: acceptable  Hydration status: euvolemic  Multimodal analgesia pain management approach  Pain management: adequate    No notable events documented.

## 2024-12-11 NOTE — ANESTHESIA PROCEDURE NOTES
Spinal Block    Patient location during procedure: holding area  End time: 12/11/2024 9:50 AM  Reason for block: primary anesthetic and at surgeon's request  Staffing  Performed: anesthesiologist   Anesthesiologist: Paul Prabhakar MD  Performed by: Paul Prabhakar MD  Authorized by: Paul Prabhakar MD    Spinal Block  Patient position: sitting  Prep: ChloraPrep and site prepped and draped  Patient monitoring: cardiac monitor, continuous pulse ox, continuous capnometry, frequent blood pressure checks and oxygen  Approach: midline  Location: L3/L4  Provider prep: sterile gloves and mask  Local infiltration: lidocaine  Needle  Needle type: Quincke   Needle gauge: 22 G  Needle length: 3.5 in  Assessment  Swirl obtained: Yes  CSF: clear  Attempts: 1  Hemodynamics: stable  Preanesthetic Checklist  Completed: patient identified, IV checked, site marked, risks and benefits discussed, surgical/procedural consents, equipment checked, pre-op evaluation, timeout performed, anesthesia consent given, oxygen available, monitors applied/VS acknowledged, fire risk safety assessment completed and verbalized and blood product R/B/A discussed and consented

## 2024-12-11 NOTE — PROGRESS NOTES
1515 Patient sitting up in bed eating lunch. Patient denies any needs at this time. Patient IV saline locked. FOB at bedside in chair holding infant.    1915 pericare completed and peripads changed. Patient holding infant skin to skin.    1900 bedside report given to Nailni TEE and care turned over

## 2024-12-11 NOTE — OP NOTE
[facility]              [address] [place]  [address]                            OPERATIVE REPORT      PATIENT NAME: JONATHAN BATEMAN             : 1990  MED REC NO: [id number]                       ROOM: 3320  ACCOUNT NO: [id number]                       ADMIT DATE: 12/10/2024  PROVIDER: Edwardo Lai MD    DATE OF SERVICE:  2024    PREOPERATIVE DIAGNOSES:  39 weeks with vaginal bleeding placental abruption.    POSTOPERATIVE DIAGNOSES:  Status post primary low flap  section.    PROCEDURES PERFORMED:  ***    SURGEON:  Edwardo Lai MD    ASSISTANT:  Dr. Tam.    ANESTHESIA:  ***    ESTIMATED BLOOD LOSS:  About 500 mL.    SPECIMENS REMOVED:  The placenta was sent.    INTRAOPERATIVE FINDINGS:  ***     COMPLICATIONS:  None.    IMPLANTS:  There was no implant.    INDICATIONS:  ***    DESCRIPTION OF PROCEDURE:  After obtaining informed consent, I expressed to the patient risks, benefits, and alternatives.  The patient was taken to the OR.  She was given spinal anesthesia.  She was prepped and draped in the usual sterile fashion.  A Pfannenstiel skin incision was made starting at the level of the fascia.  The fascia was nicked up in the midline, extended bilaterally with Larsen scissors.  The vesicouterine peritoneum was entered sharply and extended bilaterally with the Metzenbaum.  The uterus entered with scalpel and extended bilaterally with Metzenbaum.  The baby's head was delivered atraumatically.  Oropharynx was suctioned.  Cord was clamped and cut.  The baby was handed off to the pediatrician.  The placenta was manually removed.  The uterus was exteriorized and cleared off all clot and debris.  A 0 Vicryl in a running locking fashion was placed in the uterus.  Complete hemostasis was seen.  A second 0 Vicryl was used to imbricate the first one.  Hemostasis was seen.  The uterus was returned to the abdomen.  The gutters were

## 2024-12-12 LAB
ERYTHROCYTE [DISTWIDTH] IN BLOOD BY AUTOMATED COUNT: 13.9 % (ref 11.5–14.5)
HCT VFR BLD AUTO: 33.5 % (ref 35–47)
HGB BLD-MCNC: 11 G/DL (ref 11.5–16)
MCH RBC QN AUTO: 31.1 PG (ref 26–34)
MCHC RBC AUTO-ENTMCNC: 32.8 G/DL (ref 30–36.5)
MCV RBC AUTO: 94.6 FL (ref 80–99)
NRBC # BLD: 0 K/UL (ref 0–0.01)
NRBC BLD-RTO: 0 PER 100 WBC
PLATELET # BLD AUTO: 222 K/UL (ref 150–400)
PMV BLD AUTO: 11.3 FL (ref 8.9–12.9)
RBC # BLD AUTO: 3.54 M/UL (ref 3.8–5.2)
WBC # BLD AUTO: 26.6 K/UL (ref 3.6–11)

## 2024-12-12 PROCEDURE — 6370000000 HC RX 637 (ALT 250 FOR IP): Performed by: OBSTETRICS & GYNECOLOGY

## 2024-12-12 PROCEDURE — 85027 COMPLETE CBC AUTOMATED: CPT

## 2024-12-12 PROCEDURE — 6370000000 HC RX 637 (ALT 250 FOR IP): Performed by: SPECIALIST

## 2024-12-12 PROCEDURE — 1120000000 HC RM PRIVATE OB

## 2024-12-12 PROCEDURE — 36415 COLL VENOUS BLD VENIPUNCTURE: CPT

## 2024-12-12 RX ORDER — IBUPROFEN 400 MG/1
800 TABLET, FILM COATED ORAL EVERY 8 HOURS
Status: DISCONTINUED | OUTPATIENT
Start: 2024-12-12 | End: 2024-12-13 | Stop reason: HOSPADM

## 2024-12-12 RX ADMIN — IBUPROFEN 800 MG: 400 TABLET, FILM COATED ORAL at 12:51

## 2024-12-12 RX ADMIN — IBUPROFEN 800 MG: 400 TABLET, FILM COATED ORAL at 04:44

## 2024-12-12 RX ADMIN — ACETAMINOPHEN 1000 MG: 500 TABLET ORAL at 17:35

## 2024-12-12 RX ADMIN — IBUPROFEN 800 MG: 400 TABLET, FILM COATED ORAL at 20:34

## 2024-12-12 RX ADMIN — OXYCODONE 5 MG: 5 TABLET ORAL at 17:35

## 2024-12-12 RX ADMIN — DOCUSATE SODIUM 100 MG: 100 CAPSULE, LIQUID FILLED ORAL at 09:19

## 2024-12-12 RX ADMIN — DOCUSATE SODIUM 100 MG: 100 CAPSULE, LIQUID FILLED ORAL at 20:34

## 2024-12-12 RX ADMIN — ACETAMINOPHEN 1000 MG: 500 TABLET ORAL at 09:19

## 2024-12-12 RX ADMIN — OXYCODONE 5 MG: 5 TABLET ORAL at 12:53

## 2024-12-12 ASSESSMENT — PAIN DESCRIPTION - LOCATION
LOCATION: INCISION

## 2024-12-12 ASSESSMENT — PAIN SCALES - GENERAL
PAINLEVEL_OUTOF10: 4
PAINLEVEL_OUTOF10: 5
PAINLEVEL_OUTOF10: 5

## 2024-12-12 ASSESSMENT — PAIN DESCRIPTION - ORIENTATION: ORIENTATION: MID

## 2024-12-12 ASSESSMENT — PAIN DESCRIPTION - DESCRIPTORS
DESCRIPTORS: SORE
DESCRIPTORS: SORE

## 2024-12-12 NOTE — LACTATION NOTE
This note was copied from a baby's chart.     24 1530   Visit Information   Lactation Consult Visit Type IP Consult Follow Up   Visit Length 30 minutes   Referral Received From Lactation Consultant Follow-up   Reason for Visit Normal  Visit;Education   Breast Feeding History/Assessment   Left Breast Soft   Left Nipple Protrude   Right Nipple Protrude with stimulation   Right Breast Soft  (Colostrum expressed)   Breastfeeding History Yes  (See previous assessment)   Right Side Feeding   Infant Latch Observations Rooting;Wide open mouth;Good latch on;Sustained rhythmic suck   Infant Position Football;Sidelying   Infant Response to Feeding Feeding well   LATCH Documentation   Latch 2   Audible Swallowing 2   Type of Nipple 2   Comfort (Breast/Nipple) 2   Hold (Positioning) 1   LATCH Score 9   Care Plan/Breast Care   Lactation Comment Observed baby latching well and showing good signs of transfer. Mother can easily hand express colostrum.  Equipment: Mother attempting to order Motif Luanne; Measured for 17-19mm flanges  [place] oral assessment WDL  Educated on pacifier     Reviewed the \"Your Guide to Breastfeeding\" booklet. Discussed the typical feeding characteristics in the 1st and 2nd DOL and signs of adequate intake. Demonstrated the asymmetric latch and observed baby showing good signs of transfer on the breast. Discussed a feeding plan and mother's questions were addressed.    Plan:  Offer lots of skin to skin and access to the breast.  Feed baby at early signs of hunger every 2-3 hours.  Assure a deep latch, check that baby's lips are turned outward and use breast compression to keep baby actively feeding.  Pump/hand express for poor feeds and offer baby EBM.  Monitor wet and dirty diapers for signs of adequate intake.

## 2024-12-12 NOTE — PROGRESS NOTES
Post-Operative  Day 1    Mariusz Mars     Information for the patient's :  Jerad GIRL Mariusz [[id number]]   , Low Transverse Patient doing well without significant complaint. Nausea and vomiting resolved, tolerating liquids, no flatus, voiding.     Vitals:    Vitals:    24 0435   BP: 119/78   Pulse: 81   Resp: 18   Temp: 98 °F (36.7 °C)   SpO2: 96%     Temp (24hrs), Av.2 °F (36.8 °C), Min:97.8 °F (36.6 °C), Max:98.6 °F (37 °C)         Intake and Output:   Current shift: No intake/output data recorded.  Last 3 completed shifts: 12/10 1901 -  0700  In: 1178.2 [I.V.:589.5]  Out: 3280 [Urine:2075]        Exam:        Patient without distress.      Lungs clear.  Abdomen, bowel sounds present, soft, expected tenderness, fundus firm Wound dressing intact     Perineum normal lochia noted               Lower extremities are negative for swelling, cords or tenderness.    Labs:   Lab Results   Component Value Date/Time    WBC 26.6 2024 04:41 AM    WBC 11.9 12/10/2024 04:29 PM    WBC 11.7 10/21/2022 09:05 AM    WBC 13.5 2022 04:52 PM    WBC 11.7 2021 09:30 AM    HGB 11.0 2024 04:41 AM    HGB 11.5 12/10/2024 04:29 PM    HGB 12.1 10/21/2022 09:05 AM    HGB 11.1 2022 04:52 PM    HGB 12.5 2021 09:30 AM    HCT 33.5 2024 04:41 AM    HCT 34.9 12/10/2024 04:29 PM    HCT 37.4 10/21/2022 09:05 AM    HCT 33.4 2022 04:52 PM    HCT 37.0 2021 09:30 AM     2024 04:41 AM     12/10/2024 04:29 PM     10/21/2022 09:05 AM     2022 04:52 PM     2021 09:30 AM       Recent Results (from the past 24 hour(s))   CBC    Collection Time: 24  4:41 AM   Result Value Ref Range    WBC 26.6 (H) 3.6 - 11.0 K/uL    RBC 3.54 (L) 3.80 - 5.20 M/uL    Hemoglobin 11.0 (L) 11.5 - 16.0 g/dL    Hematocrit 33.5 (L) 35.0 - 47.0 %    MCV 94.6 80.0 - 99.0 FL    MCH 31.1 26.0 - 34.0 PG    MCHC 32.8 30.0 - 36.5 g/dL     RDW 13.9 11.5 - 14.5 %    Platelets 222 150 - 400 K/uL    MPV 11.3 8.9 - 12.9 FL    Nucleated RBCs 0.0 0  WBC    nRBC 0.00 0.00 - 0.01 K/uL       Assessment: Post-Op day 1, stable      Plan:   1. Routine post-operative care

## 2024-12-13 VITALS
HEIGHT: 67 IN | DIASTOLIC BLOOD PRESSURE: 85 MMHG | TEMPERATURE: 98.1 F | SYSTOLIC BLOOD PRESSURE: 136 MMHG | OXYGEN SATURATION: 97 % | BODY MASS INDEX: 42.38 KG/M2 | RESPIRATION RATE: 18 BRPM | WEIGHT: 270 LBS | HEART RATE: 84 BPM

## 2024-12-13 PROCEDURE — 6370000000 HC RX 637 (ALT 250 FOR IP): Performed by: OBSTETRICS & GYNECOLOGY

## 2024-12-13 PROCEDURE — 6370000000 HC RX 637 (ALT 250 FOR IP): Performed by: SPECIALIST

## 2024-12-13 RX ORDER — PSEUDOEPHEDRINE HCL 30 MG
100 TABLET ORAL 2 TIMES DAILY
Qty: 60 CAPSULE | Refills: 0 | Status: SHIPPED | OUTPATIENT
Start: 2024-12-13 | End: 2025-01-12

## 2024-12-13 RX ORDER — OXYCODONE HYDROCHLORIDE 5 MG/1
5 TABLET ORAL EVERY 6 HOURS PRN
Qty: 10 TABLET | Refills: 0 | Status: SHIPPED | OUTPATIENT
Start: 2024-12-13 | End: 2024-12-16

## 2024-12-13 RX ORDER — IBUPROFEN 800 MG/1
800 TABLET, FILM COATED ORAL EVERY 8 HOURS PRN
Qty: 90 TABLET | Refills: 0 | Status: SHIPPED | OUTPATIENT
Start: 2024-12-13 | End: 2025-01-12

## 2024-12-13 RX ADMIN — IBUPROFEN 800 MG: 400 TABLET, FILM COATED ORAL at 04:45

## 2024-12-13 RX ADMIN — OXYCODONE 5 MG: 5 TABLET ORAL at 14:33

## 2024-12-13 RX ADMIN — ACETAMINOPHEN 1000 MG: 500 TABLET ORAL at 11:25

## 2024-12-13 RX ADMIN — ACETAMINOPHEN 1000 MG: 500 TABLET ORAL at 02:05

## 2024-12-13 RX ADMIN — OXYCODONE 5 MG: 5 TABLET ORAL at 04:45

## 2024-12-13 RX ADMIN — DOCUSATE SODIUM 100 MG: 100 CAPSULE, LIQUID FILLED ORAL at 11:25

## 2024-12-13 RX ADMIN — IBUPROFEN 800 MG: 400 TABLET, FILM COATED ORAL at 14:33

## 2024-12-13 ASSESSMENT — PAIN DESCRIPTION - LOCATION
LOCATION: INCISION
LOCATION: ABDOMEN;INCISION
LOCATION: INCISION

## 2024-12-13 ASSESSMENT — PAIN SCALES - GENERAL
PAINLEVEL_OUTOF10: 3
PAINLEVEL_OUTOF10: 4
PAINLEVEL_OUTOF10: 2
PAINLEVEL_OUTOF10: 6

## 2024-12-13 ASSESSMENT — PAIN DESCRIPTION - DESCRIPTORS
DESCRIPTORS: SORE

## 2024-12-13 ASSESSMENT — PAIN - FUNCTIONAL ASSESSMENT
PAIN_FUNCTIONAL_ASSESSMENT: ACTIVITIES ARE NOT PREVENTED
PAIN_FUNCTIONAL_ASSESSMENT: ACTIVITIES ARE NOT PREVENTED

## 2024-12-13 ASSESSMENT — PAIN DESCRIPTION - PAIN TYPE
TYPE: SURGICAL PAIN
TYPE: SURGICAL PAIN

## 2024-12-13 NOTE — PROGRESS NOTES
I have reviewed discharge instructions with the  patient.  The  patient verbalized understanding. All questions addressed at this time. A paper copy of these instructions have been given to the patient to take home.  Mother discharged home with  in car seat.

## 2024-12-13 NOTE — PROGRESS NOTES
Post-Operative Day Number 2 Progress Note    Patient doing well post-op day 2 from  delivery without significant complaints.  Pain controlled on current medication.  Voiding without difficulty, normal lochia.    Vitals:  Patient Vitals for the past 8 hrs:   BP Temp Temp src Pulse Resp SpO2   24 0830 136/85 98.1 °F (36.7 °C) Oral 84 18 97 %   24 0445 -- -- -- -- 18 --     Temp (24hrs), Av °F (36.7 °C), Min:97.7 °F (36.5 °C), Max:98.2 °F (36.8 °C)      Vital signs stable, afebrile.    Exam:  Patient without distress. Denies s/sx of PPD or mood issues. Reports breastfeeding going well.                Abdomen soft, fundus firm at level of umbilicus, non tender.                Incision dry and clean without erythema.               Lower extremities are negative for swelling, cords or tenderness.    Lab/Data Review:  CBC:   Lab Results   Component Value Date/Time    WBC 26.6 2024 04:41 AM    HGB 11.0 2024 04:41 AM    HCT 33.5 2024 04:41 AM     2024 04:41 AM       Assessment and Plan:  Patient appears to be having uncomplicated post- course.  Continue routine post-op care and maternal education.  Would like discharge today.

## 2024-12-13 NOTE — LACTATION NOTE
This note was copied from a baby's chart.     24 2711   Visit Information   Lactation Consult Visit Type IP Consult Follow Up   Visit Length 30 minutes   Referral Received From Lactation Consultant Follow-up   Reason for Visit Normal  Visit;Education   Breast Feeding History/Assessment   Left Breast Soft  (Colostrum expressed)   Left Nipple Protrude   Right Nipple Protrude with stimulation   Right Breast Soft  (Colostrum expressed)   Breastfeeding History Yes  (See previous assessment)   Left Side Feeding   Infant Latch Observations Rooting;Wide open mouth;Good latch on;Sustained rhythmic suck   Infant Position Football;Sidelying   Infant Response to Feeding Feeding well   Right Side Feeding   Infant Latch Observations Rooting;Wide open mouth;Good latch on;Sustained rhythmic suck   Infant Position Football   Infant Response to Feeding Feeding well   LATCH Documentation   Latch 2   Audible Swallowing 2   Type of Nipple 2   Comfort (Breast/Nipple) 2   Hold (Positioning) 1   LATCH Score 9   Care Plan/Breast Care   Breast Care Pumping supply provided;Lanolin provided   Lactation Comment Observed baby showing good signs of transfer on the breast. Demonstrated techniques to keep baby actively feeding and reviewed signs of adequate intake.  Recommended mother pump and hand express after each feeding and supplement baby.   Equipment: Ordered an [employer] from [employer]. Measured for 19-20mm flanges. Educated to order silicone inserts to reduce flange size   oral assessment WDL  Educated on pacifier     Reviewed the typical feeding characteristics in the 2nd DOL and signs of adequate intake. Mother states that breastfeeding has been going well. Discussed a feeding plan and how to manage breast engorgement. Mother's questions were addressed.  Plan:  Offer lots of skin to skin and access to the breast.  Feed baby at early signs of hunger every 2-3 hours.  Assure a deep latch, check that baby's lips are

## 2024-12-13 NOTE — ANESTHESIA POST-OP
Duramorph Follow-Up Note    2 Days Post-Op sp Procedure(s):   SECTION.    /85   Pulse 84   Temp 98.1 °F (36.7 °C) (Oral)   Resp 18   Ht 1.702 m (5' 7\")   Wt 122.5 kg (270 lb)   SpO2 97%   Breastfeeding Unknown   BMI 42.29 kg/m² .      Patient is POD-1 S/P intrathecal duramorph.  Pain is well controlled  Patient reports no headache, fever, weakness or numbness.  Epidural/spinal tap site is clean, dry and intact.  No obvious Anesthesia complications noted.    Plan:    Pain management as per primary service.

## 2024-12-16 ENCOUNTER — TELEPHONE (OUTPATIENT)
Age: 34
End: 2024-12-16

## 2024-12-16 NOTE — TELEPHONE ENCOUNTER
Care Transitions Initial Follow Up Call    Outreach made within 2 business days of discharge: Yes    Patient: Mariusz Mars Patient : 1990   MRN: [id number]  Reason for Admission:  delivery  Discharge Date: 24       Spoke with: patient     Discharge department/facility: [facility] Interactive Patient Contact:  Was patient able to fill all prescriptions: Yes  Was patient instructed to bring all medications to the follow-up visit: Yes  Is patient taking all medications as directed in the discharge summary? Yes  Does patient understand their discharge instructions: Yes  Does patient have questions or concerns that need addressed prior to 7-14 day follow up office visit: no    Additional needs identified to be addressed with provider  No needs identified             Scheduled appointment with PCP within 7-14 days    Follow Up  No future appointments.    Caridad Abraham MA

## 2025-02-03 NOTE — PROGRESS NOTES
Chief Complaint   Patient presents with    Weight Management    Hypertension     Patient had hypertension during last pregnancy .. She has stopped Labetalol          HPI:       is a 34 y.o. female.  Works for [employer].  In the [employer]  Has 4 children between her 1 daughter, 2 sons and 2 stepdaughters. Asthma: Uses an albuterol inhaler intermittently.       Eczema: Back on Dupixent.  Managed by [facility].  Works well.       Depression: Previously on Effexor.  Held during pregnancy. Has not restarted, but is doing counseling.      Prediabetes: Previously on Ozempic. Chronic issues with her weight.  Tried Contrave in the past without success.  Reports watching her diet and exercising.    Hemoglobin A1C   Date Value Ref Range Status   02/06/2023 6.1 (H) 4.0 - 5.6 % Final     Comment:     NEW METHOD  PLEASE NOTE NEW REFERENCE RANGE  (NOTE)  HbA1C Interpretive Ranges  <5.7              Normal  5.7 - 6.4         Consider Prediabetes  >6.5              Consider Diabetes        New Issues: She has been taking care of her baby.   She was on Labetalol after her pregnancy.  She also had placental abruption.  Stopped her Labetalol on her own recently.  She lost 25 lbs after her delivery. Frustrated by her current weight.  Does admit to some dietary indiscretions (Poptarts and pretzels).  She is getting an IUD on Thursday.    Allergies   Allergen Reactions    Peanut-Containing Drug Products Anaphylaxis, Rash, Itching and Other (See Comments)     Allergic to all nuts  Allergic to all nuts      Shellfish-Derived Products Hives, Anaphylaxis, Rash and Itching    Peanut (Diagnostic) Hives       Current Outpatient Medications   Medication Sig Dispense Refill    dupilumab (DUPIXENT) 300 MG/2ML SOSY injection Inject 2 mLs into the skin once      vitamin D 50 MCG (2000 UT) CAPS capsule Take 1 capsule by mouth daily      albuterol sulfate HFA (PROVENTIL;VENTOLIN;PROAIR) 108 (90 Base) MCG/ACT inhaler Inhale 2

## 2025-02-04 ENCOUNTER — OFFICE VISIT (OUTPATIENT)
Age: 35
End: 2025-02-04
Payer: COMMERCIAL

## 2025-02-04 VITALS
DIASTOLIC BLOOD PRESSURE: 85 MMHG | BODY MASS INDEX: 42.85 KG/M2 | HEART RATE: 100 BPM | SYSTOLIC BLOOD PRESSURE: 104 MMHG | OXYGEN SATURATION: 97 % | HEIGHT: 67 IN | RESPIRATION RATE: 16 BRPM | WEIGHT: 273 LBS | TEMPERATURE: 97.8 F

## 2025-02-04 DIAGNOSIS — L30.9 ECZEMA, UNSPECIFIED TYPE: ICD-10-CM

## 2025-02-04 DIAGNOSIS — E66.01 CLASS 3 SEVERE OBESITY WITH SERIOUS COMORBIDITY AND BODY MASS INDEX (BMI) OF 40.0 TO 44.9 IN ADULT, UNSPECIFIED OBESITY TYPE: ICD-10-CM

## 2025-02-04 DIAGNOSIS — E66.813 CLASS 3 SEVERE OBESITY WITH SERIOUS COMORBIDITY AND BODY MASS INDEX (BMI) OF 40.0 TO 44.9 IN ADULT, UNSPECIFIED OBESITY TYPE: ICD-10-CM

## 2025-02-04 PROCEDURE — 99214 OFFICE O/P EST MOD 30 MIN: CPT | Performed by: NURSE PRACTITIONER

## 2025-02-04 RX ORDER — DUPILUMAB 300 MG/2ML
300 INJECTION, SOLUTION SUBCUTANEOUS ONCE
COMMUNITY

## 2025-02-04 ASSESSMENT — PATIENT HEALTH QUESTIONNAIRE - PHQ9
SUM OF ALL RESPONSES TO PHQ QUESTIONS 1-9: 0
SUM OF ALL RESPONSES TO PHQ QUESTIONS 1-9: 0
SUM OF ALL RESPONSES TO PHQ9 QUESTIONS 1 & 2: 0
SUM OF ALL RESPONSES TO PHQ QUESTIONS 1-9: 0
2. FEELING DOWN, DEPRESSED OR HOPELESS: NOT AT ALL
SUM OF ALL RESPONSES TO PHQ QUESTIONS 1-9: 0
1. LITTLE INTEREST OR PLEASURE IN DOING THINGS: NOT AT ALL

## 2025-02-04 NOTE — PROGRESS NOTES
Mariusz Mars is a 34 y.o. female presenting for/with:    Chief Complaint   Patient presents with    Weight Management    Hypertension     Patient had hypertension during last pregnancy .. She has stopped Labetalol        Vitals:    02/04/25 0939   BP: 104/85   Site: Left Upper Arm   Position: Sitting   Pulse: 100   Resp: 16   Temp: 97.8 °F (36.6 °C)   TempSrc: Temporal   SpO2: 97%   Weight: 123.8 kg (273 lb)   Height: 1.702 m (5' 7\")       Pain Scale: 0 - No pain/10  Pain Location:     \"Have you been to the ER, urgent care clinic since your last visit?  Hospitalized since your last visit?\"    NO    “Have you seen or consulted any other health care providers outside of [facility] since your last visit?”    NO                 2/4/2025     9:37 AM   PHQ-9    Little interest or pleasure in doing things 0   Feeling down, depressed, or hopeless 0   PHQ-2 Score 0   PHQ-9 Total Score 0           6/9/2023    10:40 AM 5/13/2022    12:00 AM 6/29/2021    12:00 AM   [facility] AMB LEARNING ASSESSMENT   Primary Learner Patient Patient Patient   Primary Language ENGLISH ENGLISH ENGLISH   Learning Preference DEMONSTRATION READING READING   Answered By patient pt patient   Relationship to Learner SELF SELF SELF            8/19/2022     8:29 AM   Amb Fall Risk Assessment and TUG Test   Total Score 1           8/28/2023     2:00 PM 6/9/2023    10:00 AM   ADL ASSESSMENT   Feeding yourself No Help Needed No Help Needed   Getting from bed to chair No Help Needed No Help Needed   Getting dressed No Help Needed No Help Needed   Bathing or showering No Help Needed No Help Needed   Walk across the room (includes cane/walker) No Help Needed No Help Needed   Using the telphone No Help Needed No Help Needed   Taking your medications No Help Needed No Help Needed   Preparing meals No Help Needed No Help Needed   Managing money (expenses/bills) No Help Needed No Help Needed   Moderately strenuous housework (laundry) No Help Needed No

## 2025-06-02 NOTE — PROGRESS NOTES
Chief Complaint   Patient presents with    Arm Pain     R elbow pain when flexing elbow or lifting baby .. States the arm feels weak and will shoot a stabbing pain at times     Weight Management     Discuss weight gain since last child          HPI:       is a 34 y.o. female.  Works for [employer].  In the [employer].  Has 4 children between her 1 daughter, 2 sons and 2 stepdaughters. Asthma: Uses an albuterol inhaler intermittently.       Eczema: Back on Dupixent.  Managed by [facility].  Works well.       Depression: Previously on Effexor.  Held during pregnancy. Has not restarted, but is doing counseling.      Prediabetes: Previously on Ozempic. Chronic issues with her weight.  Tried Contrave in the past without success.  Reports watching her diet and exercising.    Hemoglobin A1C   Date Value Ref Range Status   02/06/2023 6.1 (H) 4.0 - 5.6 % Final     Comment:     NEW METHOD  PLEASE NOTE NEW REFERENCE RANGE  (NOTE)  HbA1C Interpretive Ranges  <5.7              Normal  5.7 - 6.4         Consider Prediabetes  >6.5              Consider Diabetes        New Issues:  Her weight has been an issue since her last baby.  She has been back on Effexor for about a month.  Feels like she was stress eating before. Her A1c was around 6% in April when she had labs checked by her psychiatrist in April.  She was previously on Wegovy, but stopped when she was pregnant.  She is not currently breastfeeding.  She also has left elbow pain that radiates to her pinky finger.  Does a lot of repetitive motions at work.  Lifting the baby also causes pain.      Allergies   Allergen Reactions    Peanut-Containing Drug Products Anaphylaxis, Rash, Itching and Other (See Comments)     Allergic to all nuts  Allergic to all nuts      Shellfish-Derived Products Hives, Anaphylaxis, Rash and Itching    Peanut (Diagnostic) Hives       Current Outpatient Medications   Medication Sig Dispense Refill    vitamin D (ERGOCALCIFEROL)

## 2025-06-03 ENCOUNTER — OFFICE VISIT (OUTPATIENT)
Age: 35
End: 2025-06-03
Payer: COMMERCIAL

## 2025-06-03 VITALS
DIASTOLIC BLOOD PRESSURE: 92 MMHG | OXYGEN SATURATION: 98 % | SYSTOLIC BLOOD PRESSURE: 123 MMHG | WEIGHT: 289.8 LBS | TEMPERATURE: 98 F | BODY MASS INDEX: 45.48 KG/M2 | HEIGHT: 67 IN | HEART RATE: 106 BPM | RESPIRATION RATE: 16 BRPM

## 2025-06-03 DIAGNOSIS — E66.813 CLASS 3 SEVERE OBESITY WITH SERIOUS COMORBIDITY AND BODY MASS INDEX (BMI) OF 45.0 TO 49.9 IN ADULT, UNSPECIFIED OBESITY TYPE (HCC): Primary | ICD-10-CM

## 2025-06-03 DIAGNOSIS — R03.0 ELEVATED BLOOD-PRESSURE READING WITHOUT DIAGNOSIS OF HYPERTENSION: ICD-10-CM

## 2025-06-03 DIAGNOSIS — R73.03 PREDIABETES: ICD-10-CM

## 2025-06-03 DIAGNOSIS — M77.01 GOLFER'S ELBOW, RIGHT: ICD-10-CM

## 2025-06-03 PROCEDURE — 99214 OFFICE O/P EST MOD 30 MIN: CPT | Performed by: NURSE PRACTITIONER

## 2025-06-03 RX ORDER — ERGOCALCIFEROL 1.25 MG/1
50000 CAPSULE, LIQUID FILLED ORAL WEEKLY
COMMUNITY
Start: 2025-04-23

## 2025-06-03 RX ORDER — VENLAFAXINE HYDROCHLORIDE 75 MG/1
75 CAPSULE, EXTENDED RELEASE ORAL DAILY
COMMUNITY
Start: 2025-04-23

## 2025-06-03 ASSESSMENT — PATIENT HEALTH QUESTIONNAIRE - PHQ9
SUM OF ALL RESPONSES TO PHQ QUESTIONS 1-9: 0
2. FEELING DOWN, DEPRESSED OR HOPELESS: NOT AT ALL
SUM OF ALL RESPONSES TO PHQ QUESTIONS 1-9: 0
SUM OF ALL RESPONSES TO PHQ QUESTIONS 1-9: 0
1. LITTLE INTEREST OR PLEASURE IN DOING THINGS: NOT AT ALL
SUM OF ALL RESPONSES TO PHQ QUESTIONS 1-9: 0

## 2025-06-03 NOTE — PROGRESS NOTES
Mariusz Mars is a 34 y.o. female presenting for/with:    Chief Complaint   Patient presents with    Arm Pain     R elbow pain when flexing elbow or lifting baby .. States the arm feels weak and will shoot a stabbing pain at times     Weight Management     Discuss weight gain since last child        Vitals:    06/03/25 1518   BP: (!) 123/92   BP Site: Left Upper Arm   Patient Position: Sitting   Pulse: (!) 106   Resp: 16   Temp: 98 °F (36.7 °C)   TempSrc: Temporal   SpO2: 98%   Weight: 131.5 kg (289 lb 12.8 oz)   Height: 1.702 m (5' 7\")       Pain Scale: 0 - No pain/10  Pain Location:     \"Have you been to the ER, urgent care clinic since your last visit?  Hospitalized since your last visit?\"    NO    “Have you seen or consulted any other health care providers outside of [facility] since your last visit?”    NO                 6/3/2025     3:16 PM   PHQ-9    Little interest or pleasure in doing things 0   Feeling down, depressed, or hopeless 0   PHQ-2 Score 0   PHQ-9 Total Score 0           6/9/2023    10:40 AM 5/13/2022    12:00 AM 6/29/2021    12:00 AM   [facility] AMB LEARNING ASSESSMENT   Primary Learner Patient Patient Patient   Primary Language ENGLISH ENGLISH ENGLISH   Learning Preference DEMONSTRATION READING READING   Answered By patient pt patient   Relationship to Learner SELF SELF SELF            8/19/2022     8:29 AM   Amb Fall Risk Assessment and TUG Test   Total Score 1           6/3/2025     3:00 PM 8/28/2023     2:00 PM 6/9/2023    10:00 AM   ADL ASSESSMENT   Feeding yourself No Help Needed No Help Needed No Help Needed   Getting from bed to chair No Help Needed No Help Needed No Help Needed   Getting dressed No Help Needed No Help Needed No Help Needed   Bathing or showering No Help Needed No Help Needed No Help Needed   Walk across the room (includes cane/walker) No Help Needed No Help Needed No Help Needed   Using the telphone No Help Needed No Help Needed No Help Needed   Taking your

## 2025-06-06 ENCOUNTER — TELEPHONE (OUTPATIENT)
Age: 35
End: 2025-06-06

## (undated) DEVICE — SOLUTION IRRIG 1000ML 0.9% SOD CHL USP POUR PLAS BTL

## (undated) DEVICE — 1LYRTR 16FR10ML 100%SILI SNAP: Brand: MEDLINE INDUSTRIES, INC.

## (undated) DEVICE — C-SECTION II-LF: Brand: MEDLINE INDUSTRIES, INC.

## (undated) DEVICE — LARGE, DISPOSABLE ALEXIS O C-SECTION PROTECTOR - RETRACTOR: Brand: ALEXIS ® O C-SECTION PROTECTOR - RETRACTOR

## (undated) DEVICE — C-SECTION MRMC: Brand: MEDLINE INDUSTRIES, INC.

## (undated) DEVICE — PENCIL ES L3M BTTN SWCH S STL HEX LOK BLDE ELECTRD HOLSTER

## (undated) DEVICE — SUTURE VICRYL SZ 0 L36IN ABSRB UD L40MM CT 1/2 CIR TAPERPOINT J958H

## (undated) DEVICE — APPLICATOR MEDICATED 26 CC SOLUTION HI LT ORNG CHLORAPREP

## (undated) DEVICE — PAD,ABDOMINAL,5"X9",ST,LF,25/BX: Brand: MEDLINE INDUSTRIES, INC.

## (undated) DEVICE — TUBING, SUCTION, 1/4" X 10', STRAIGHT: Brand: MEDLINE

## (undated) DEVICE — ELECTRODE PT RET AD L9FT HI MOIST COND ADH HYDRGEL CORDED

## (undated) DEVICE — CLEANER ES TIP W2XL2IN ADH BK RADPQ FOR S STL ELECTRD

## (undated) DEVICE — SOLUTION IRRIG 1000ML STRL H2O USP PLAS POUR BTL

## (undated) DEVICE — SUTURE VICRYL SZ 1 L36IN ABSRB VLT L48MM CTX 1/2 CIR J371H

## (undated) DEVICE — SOLIDIFIER FLD 3.2OZ 3000CC TRAD IN BTL LIQUI-LOC

## (undated) DEVICE — Z DISCONTINUED NO SUB IDED SPONGE LAPAROTOMY W18XL18IN WHITE STRUNG RADIOPAQUE STERILE

## (undated) DEVICE — STAPLER SKIN SQ 30 ABSRB STPL DISP INSORB ORDER VIA PHONE OR EMAIL